# Patient Record
Sex: FEMALE | Race: WHITE | Employment: FULL TIME | URBAN - METROPOLITAN AREA
[De-identification: names, ages, dates, MRNs, and addresses within clinical notes are randomized per-mention and may not be internally consistent; named-entity substitution may affect disease eponyms.]

---

## 2022-09-23 ENCOUNTER — TELEPHONE (OUTPATIENT)
Dept: BARIATRICS/WEIGHT MGMT | Age: 42
End: 2022-09-23

## 2022-09-23 ENCOUNTER — INITIAL CONSULT (OUTPATIENT)
Dept: BARIATRICS/WEIGHT MGMT | Age: 42
End: 2022-09-23
Payer: COMMERCIAL

## 2022-09-23 VITALS
SYSTOLIC BLOOD PRESSURE: 173 MMHG | TEMPERATURE: 97 F | DIASTOLIC BLOOD PRESSURE: 101 MMHG | WEIGHT: 293 LBS | RESPIRATION RATE: 20 BRPM | BODY MASS INDEX: 51.91 KG/M2 | HEIGHT: 63 IN | HEART RATE: 81 BPM

## 2022-09-23 DIAGNOSIS — K30 INDIGESTION: Primary | ICD-10-CM

## 2022-09-23 DIAGNOSIS — K21.9 GASTROESOPHAGEAL REFLUX DISEASE WITHOUT ESOPHAGITIS: ICD-10-CM

## 2022-09-23 DIAGNOSIS — E66.01 MORBID OBESITY DUE TO EXCESS CALORIES (HCC): ICD-10-CM

## 2022-09-23 PROCEDURE — 99202 OFFICE O/P NEW SF 15 MIN: CPT

## 2022-09-23 PROCEDURE — G8419 CALC BMI OUT NRM PARAM NOF/U: HCPCS | Performed by: SURGERY

## 2022-09-23 PROCEDURE — 1036F TOBACCO NON-USER: CPT | Performed by: SURGERY

## 2022-09-23 PROCEDURE — 99204 OFFICE O/P NEW MOD 45 MIN: CPT | Performed by: SURGERY

## 2022-09-23 PROCEDURE — G8427 DOCREV CUR MEDS BY ELIG CLIN: HCPCS | Performed by: SURGERY

## 2022-09-23 NOTE — PROGRESS NOTES
Becky Holguin  9/23/2022  [facility]    Initial Evaluation  Bariatric Surgery and EGD       Subjective:  CHIEF COMPLAINT: Morbid obesity, malnutrition, Hypertension, Obstructive Sleep Apnea, Dyspnea on Exertion, Depression, Extremity Edema, Non-alcoholic Steatohepatitis (SAHU), Polycystic Ovarian Syndrome, and Venous Stasis Disease    HISTORY OF PRESENT ILLNESS: Becky Holguin is a morbidly-obese 36 y.o.  female, who weighs (!) 376 lb (170.6 kg). She is 229 pounds over her ideal body weight. The severity is severe with Body mass index is 66.61 kg/m². She has multiple medical problems aggravated by her obesity. They have been overweight since age 14. She wishes to have bariatric surgery so that she can lose a large amount of weight to a goal of their ideal body weight based on height, build and sex, and keep the weight off. I have met with [facility] in the [facility] where we discussed the surgery in great detail and went over the risks and benefits. She has watched our informational video so she understands all of the extensive risks involved. She states that she understands all of the risks and wishes to proceed with the evaluation. We have discussed the different surgical options in detail with use of diagrams and drawings to detail the procedures, risks and alternatives. We discussed the postoperative diet and proposed life long diet for weight management after surgery.      They are a nonsmoker  They have no significant exposure to second hand smoke    Past Medical History  Patient Active Problem List   Diagnosis    High-risk pregnancy    Passive smoker    Morbid obesity with BMI of 50.0-59.9, adult ([address]    AMA (advanced maternal age) multigravida 35+    Maternal morbid obesity, antepartum ([address]    Elderly multigravida with antepartum condition or complication    Suspected damage to fetus from drugs, affecting management of mother, antepartum    M[facility] morbid obesity, antepartum [address]    Benign essential hypertension, antepartum    HTN complicating peripregnancy, antepartum    Proteinuria complicating pregnancy in third trimester     Past Surgical History  Past Surgical History:   Procedure Laterality Date     SECTION      COLPOSCOPY      DILATION AND CURETTAGE      SKIN BIOPSY        Allergies: Tetracyclines & related and Oxycodone     Home Medications  Current Outpatient Medications   Medication Sig Dispense Refill    DULoxetine (CYMBALTA) 60 MG extended release capsule Take 60 mg by mouth daily      losartan (COZAAR) 25 MG tablet Take 25 mg by mouth daily      LORazepam (ATIVAN) 0.5 MG tablet Take 0.5 mg by mouth every 6 hours as needed for Anxiety. No current facility-administered medications for this visit. Social History:   TOBACCO:   reports that she has never smoked. She has never used smokeless tobacco.  All smokers must join the free smoking cessation program and stop smoking for 3 months before having any Bariatric surgery. ETOH:    reports no history of alcohol use. The patient has a family history that is negative for severe cardiovascular or respiratory issues, negative for reaction to anesthesia. Review of Systems    A complete 10 system review was performed and are otherwise negative unless mentioned in the above HPI. Specific negatives are listed below but may not include all those reviewed.     General ROS: negative obtundation, AMS  ENT ROS: negative rhinorrhea, epistaxis  Allergy and Immunology ROS: negative itchy/watery eyes or nasal congestion  Hematological and Lymphatic ROS: negative spontaneous bleeding or bruising  Endocrine ROS: negative  lethargy, mood swings, palpitations or polydipsia/polyuria  Respiratory ROS: negative sputum changes, stridor, tachypnea or wheezing  Cardiovascular ROS: negative for - loss of consciousness, murmur or orthopnea  Gastrointestinal ROS: negative for - hematochezia or hematemesis  Genito-Urinary ROS: negative for -  genital discharge or hematuria  Musculoskeletal ROS: negative for - focal weakness, gangrene  Psych/Neuro ROS: negative for - visual or auditory hallucinations, suicidal ideation      Physical Exam:   VITALS: BP (!) 173/101 (Site: Left Lower Arm, Position: Sitting, Cuff Size: Large Adult)   Pulse 81   Temp 97 °F (36.1 °C) (Temporal)   Resp 20   Ht 5' 3\" (1.6 m)   Wt (!) 376 lb (170.6 kg)   BMI 66.61 kg/m²   General appearance: AAO, NAD  Eyes: PERRL, EOMI, red conjunctiva  Lungs: equal chest rise bilateral, no wheeze, no rhonchi  Chest wall: atraumatic, no tenderness, no echymosis or abrasions  Heart: reg rate, no murmur  Abdomen: soft, nondistended, nontender, obese  Extremities: full ROM all 4 ext, no gross motor or sensory deficits  Pulses: 2+ distal  Skin: warm and dry  Neurologic: spontanous eye opening, purposeful, follows complex commands    Assessment/Plan:  Mehdi Robertson is a morbidly-obese 36 y.o. female with failure of medical management, inability to keep the weight off with diet and exercise. She is interested in Surgical weight loss and specifically we discussed at length the options of Laparoscopic Sd-en- Y Gastric Bypass or Sleeve Gastrectomy. We discussed that our goal is to ameliorate the medical problems and not to obtain a specific body mass index. She understands the risks and benefits, and wishes to proceed with the evaluation. 1. Pre-op evaluation  EGD with biopsy- Scheduled   - US GALLBLADDER RUQ; Scheduled     2. Morbid obesity due to excess calories ([address]    - medical weight loss management- dietary and nutritional counseling provided for 15min during our conversation with emphasis on protein intake, low calorie, limit late night eating and eating away from the table with other distractions. - Comprehensive Metabolic Panel; Future  - CBC; Future  - Hemoglobin A1C; Future  - Ferritin; Future  - Triglyceride;  Future  - Cholesterol, Total; Future  - Zinc; Future  - Vitamin B12; Future  - Folate; Future  - Vitamin B1; Future  - Vitamin D 25 Hydroxy; Future  - Prealbumin; Future  - US GALLBLADDER RUQ; Scheduled     3. Gastroesophageal reflux disease without esophagitis  RUQ US eval gallbladder disease  EGD eval H pylori/gastritis  Continue tums prn    4. PCOS- weight loss assistance      Psych evaluation, medical clearance, right upper quadrant ultrasound to evaluate for fatty liver disease and gallbladder abnormalities. These patients often have vitamin deficiencies so I will do screening labs for malnutrition. I will do an upper endoscopy to check for hiatal hernias, ulcers and H. Pylori while we wait for insurance approval for the surgery. I have spent over 45min in evaluation of the patient including greater than 50% of that time face to face discussing surgical options, medical and surgical history, plans for medical weight loss management and preoperative clearance for surgery. They did not have family present for the discussion and visual aides and drawings were used to explain anatomy and surgical procedures.      Follow up with surgical weight loss office after completed the above to begin dietary and nutritional counseling to achieve weight loss at or near 10% their excess body weight prior to surgery    Physician Signature: Electronically signed by Dr. Margaret Dugan MD    Send copy of H&P to PCP, Floridalma Rivera DO

## 2022-09-23 NOTE — TELEPHONE ENCOUNTER
Prior Authorization Form  DEMOGRAPHICS:    Patient Name:  Shari Reynoso  Patient :  1980            Insurance:  Payor: Wes Vaughan / Plan: Bryan Aaron / Product Type: *No Product type* /   Insurance ID Number:    Payer/Plan Subscr  Sex Relation Sub.  Ins. ID Effective Group Num   [facility] AARON ULLOA 1980 Female Self [id number] 5/[birth year]/15 [facility] [address]         DIAGNOSIS & PROCEDURE:    Procedure/Operation: egd           CPT Code: 06361    Diagnosis:  gerd    ICD10 Code: k21.9    Location:  [facility]    Surgeon:  Pj Dunne INFORMATION:    Date: 10/20/22    Time:               Anesthesia:  MAC/TIVA                                                       Status:  Outpatient        Special Comments:         Electronically signed by Alejandra Vaughan MA on 2022 at 12:05 PM

## 2022-09-23 NOTE — PATIENT INSTRUCTIONS
What is the next step to proceed with weight loss surgery? Please be aware that any co-pays or deductibles may be requested prior to testing and / or procedures. You will need to schedule a psychological evaluation for weight loss surgery. Patients will be required to complete all psychological testing as required by the mental health provider. Patients must also follow all of the provider's recommendations before weight loss surgery can be scheduled. The evaluation must be done a standard way for weight loss surgery. We strongly recommend that you contact one of our preferred providers listed below to arrange this:      Jose Francisco Chapin, [facility], [place]  [address] [address] [facility]   [phone]    Juany [facility] and [address] [facility] [address] [facility]   [phone]    Dr. Mark Hoff, PhD    [facility]. [facility]    [phone]      You will also need to plan on attending a 2 hour nutrition class at the [facility] prior to your surgery. We will schedule this for you when we schedule your surgery. Please remember to have your labs drawn 10 days prior to your first scheduled dietary appointment. Please remember, that while we will submit your case to insurance for surgery authorization, it is your responsibility to know if your plan covers weight loss surgery and keep up-to-date with changes to your insurance coverage. We will do everything possible to help you get approved for weight loss surgery, but cannot guarantee an approval.     Please note that you will not be submitted to your insurance company until all pre-operative testing requirements are met.

## 2022-10-14 ENCOUNTER — HOSPITAL ENCOUNTER (OUTPATIENT)
Dept: ULTRASOUND IMAGING | Age: 42
Discharge: HOME | End: 2022-10-16
Payer: COMMERCIAL

## 2022-10-14 ENCOUNTER — HOSPITAL ENCOUNTER (OUTPATIENT)
Age: 42
Discharge: HOME | End: 2022-10-14
Payer: COMMERCIAL

## 2022-10-14 DIAGNOSIS — E66.01 MORBID OBESITY DUE TO EXCESS CALORIES (HCC): ICD-10-CM

## 2022-10-14 DIAGNOSIS — K30 INDIGESTION: ICD-10-CM

## 2022-10-14 LAB
ALBUMIN SERPL-MCNC: 4.3 G/DL (ref 3.5–5.2)
ALP BLD-CCNC: 87 U/L (ref 35–104)
ALT SERPL-CCNC: 13 U/L (ref 0–32)
ANION GAP SERPL CALCULATED.3IONS-SCNC: 11 MMOL/L (ref 7–16)
AST SERPL-CCNC: 12 U/L (ref 0–31)
BASOPHILS ABSOLUTE: 0.05 E9/L (ref 0–0.2)
BASOPHILS RELATIVE PERCENT: 0.6 % (ref 0–2)
BILIRUB SERPL-MCNC: 0.4 MG/DL (ref 0–1.2)
BUN BLDV-MCNC: 13 MG/DL (ref 6–20)
CALCIUM SERPL-MCNC: 9.2 MG/DL (ref 8.6–10.2)
CHLORIDE BLD-SCNC: 105 MMOL/L (ref 98–107)
CHOLESTEROL, TOTAL: 179 MG/DL (ref 0–199)
CO2: 24 MMOL/L (ref 22–29)
CREAT SERPL-MCNC: 0.7 MG/DL (ref 0.5–1)
EOSINOPHILS ABSOLUTE: 0.11 E9/L (ref 0.05–0.5)
EOSINOPHILS RELATIVE PERCENT: 1.3 % (ref 0–6)
FERRITIN: 46 NG/ML
FOLATE: 6.5 NG/ML (ref 4.8–24.2)
GFR AFRICAN AMERICAN: >60
GFR SERPL CREATININE-BSD FRML MDRD: 91 ML/MIN/1.73
GLUCOSE BLD-MCNC: 95 MG/DL (ref 74–99)
HBA1C MFR BLD: 5.3 % (ref 4–5.6)
HCT VFR BLD CALC: 44.2 % (ref 34–48)
HEMOGLOBIN: 13.9 G/DL (ref 11.5–15.5)
IMMATURE GRANULOCYTES #: 0.04 E9/L
IMMATURE GRANULOCYTES %: 0.5 % (ref 0–5)
LYMPHOCYTES ABSOLUTE: 2.63 E9/L (ref 1.5–4)
LYMPHOCYTES RELATIVE PERCENT: 31.2 % (ref 20–42)
MCH RBC QN AUTO: 30.8 PG (ref 26–35)
MCHC RBC AUTO-ENTMCNC: 31.4 % (ref 32–34.5)
MCV RBC AUTO: 98 FL (ref 80–99.9)
MONOCYTES ABSOLUTE: 0.67 E9/L (ref 0.1–0.95)
MONOCYTES RELATIVE PERCENT: 7.9 % (ref 2–12)
NEUTROPHILS ABSOLUTE: 4.94 E9/L (ref 1.8–7.3)
NEUTROPHILS RELATIVE PERCENT: 58.5 % (ref 43–80)
PDW BLD-RTO: 13.4 FL (ref 11.5–15)
PLATELET # BLD: 334 E9/L (ref 130–450)
PMV BLD AUTO: 10.4 FL (ref 7–12)
POTASSIUM SERPL-SCNC: 3.9 MMOL/L (ref 3.5–5)
PREALBUMIN: 23 MG/DL (ref 20–40)
RBC # BLD: 4.51 E12/L (ref 3.5–5.5)
SODIUM BLD-SCNC: 140 MMOL/L (ref 132–146)
TOTAL PROTEIN: 7.6 G/DL (ref 6.4–8.3)
TRIGL SERPL-MCNC: 101 MG/DL (ref 0–149)
VITAMIN B-12: 292 PG/ML (ref 211–946)
WBC # BLD: 8.4 E9/L (ref 4.5–11.5)

## 2022-10-14 PROCEDURE — 80053 COMPREHEN METABOLIC PANEL: CPT

## 2022-10-14 PROCEDURE — 82465 ASSAY BLD/SERUM CHOLESTEROL: CPT

## 2022-10-14 PROCEDURE — 76705 ECHO EXAM OF ABDOMEN: CPT

## 2022-10-14 PROCEDURE — 36415 COLL VENOUS BLD VENIPUNCTURE: CPT

## 2022-10-14 PROCEDURE — 82746 ASSAY OF FOLIC ACID SERUM: CPT

## 2022-10-14 PROCEDURE — 82607 VITAMIN B-12: CPT

## 2022-10-14 PROCEDURE — 84478 ASSAY OF TRIGLYCERIDES: CPT

## 2022-10-14 PROCEDURE — 84134 ASSAY OF PREALBUMIN: CPT

## 2022-10-14 PROCEDURE — 83036 HEMOGLOBIN GLYCOSYLATED A1C: CPT

## 2022-10-14 PROCEDURE — 85025 COMPLETE CBC W/AUTO DIFF WBC: CPT

## 2022-10-14 PROCEDURE — 84630 ASSAY OF ZINC: CPT

## 2022-10-14 PROCEDURE — 82728 ASSAY OF FERRITIN: CPT

## 2022-10-14 PROCEDURE — 84425 ASSAY OF VITAMIN B-1: CPT

## 2022-10-18 LAB — ZINC: 92.5 UG/DL (ref 60–120)

## 2022-10-19 ENCOUNTER — ANESTHESIA EVENT (OUTPATIENT)
Dept: ENDOSCOPY | Age: 42
End: 2022-10-19
Payer: COMMERCIAL

## 2022-10-19 RX ORDER — ACETAMINOPHEN 500 MG
1000 TABLET ORAL PRN
COMMUNITY

## 2022-10-19 RX ORDER — DOCUSATE SODIUM 100 MG/1
100 CAPSULE, LIQUID FILLED ORAL 2 TIMES DAILY PRN
COMMUNITY

## 2022-10-19 RX ORDER — IBUPROFEN 800 MG/1
800 TABLET ORAL PRN
COMMUNITY

## 2022-10-19 RX ORDER — OMEPRAZOLE 40 MG/1
40 CAPSULE, DELAYED RELEASE ORAL DAILY
COMMUNITY

## 2022-10-19 RX ORDER — FLUTICASONE PROPIONATE 50 MCG
1 SPRAY, SUSPENSION (ML) NASAL PRN
COMMUNITY

## 2022-10-19 NOTE — PROGRESS NOTES
[facility]                                                                                                                    PRE OP INSTRUCTIONS FOR  Raheem Paris        Date: 10/19/2022    Date of surgery: 10/20/22   Arrival Time: Hospital will call you between 5pm and 7pm with your final arrival time for surgery    Nothing by mouth (NPO) as instructed.____________________________________________________________________    Take the following medications with a small sip of water on the morning of Surgery: omeprazole, prn ativan     Diabetics may take evening dose of insulin but none after midnight. If you feel symptomatic or low blood sugar morning of surgery drink 1-2 ounces of apple juice only. Aspirin, Ibuprofen, Advil, Naproxen, Vitamin E and other Anti-inflammatory products should be stopped  before surgery  as directed by your physician. Take Tylenol only unless instructed otherwise by your surgeon. Check with your Doctor regarding stopping Plavix, Coumadin, Lovenox, Eliquis, Effient, or other blood thinners. Do not smoke,use illicit drugs and do not drink any alcoholic beverages 24 hours prior to surgery. You may brush your teeth the morning of surgery. DO NOT SWALLOW WATER    You MUST make arrangements for a responsible adult to take you home after your surgery. You will not be allowed to leave alone or drive yourself home. It is strongly suggested someone stay with you the first 24 hrs. Your surgery will be cancelled if you do not have a ride home. PEDIATRIC PATIENTS ONLY:  A parent/legal guardian must accompany a child scheduled for surgery and plan to stay at the hospital until the child is discharged. Please do not bring other children with you.     Please wear simple, loose fitting clothing to the hospital.  Daryel Bladimir not bring valuables (money, credit cards, checkbooks, etc.) Do not wear any makeup (including no eye makeup) or nail polish on your fingers or toes.    DO NOT wear any jewelry or piercings on day of surgery. All body piercing jewelry must be removed. Shower the night before surgery with __x_Antibacterial soap /ROSANNE WIPES________    TOTAL JOINT REPLACEMENT/HYSTERECTOMY PATIENTS ONLY---Remember to bring Blood Bank bracelet to the hospital on the day of surgery. If you have a Living Will and Durable Power of  for Healthcare, please bring in a copy. If appropriate bring crutches, inspirex, WALKER, CANE etc... Notify your Surgeon if you develop any illness between now and surgery time, cough, cold, fever, sore throat, nausea, vomiting, etc.  Please notify your surgeon if you experience dizziness, shortness of breath or blurred vision between now & the time of your surgery. If you have ___dentures, they will be removed before going to the OR; we will provide you a container. If you wear ___contact lenses or ___glasses, they will be removed; please bring a case for them. To provide excellent care visitors will be limited to 2 in the room at any given time. Please bring picture ID and insurance card. During flu season no children under the age of 15 are permitted in the hospital for the safety of all patients. Other please check in at the information desk/main lobby. Please call AMBULATORY CARE if you have any further questions.    [facility]     [address]

## 2022-10-20 ENCOUNTER — ANESTHESIA (OUTPATIENT)
Dept: ENDOSCOPY | Age: 42
End: 2022-10-20
Payer: COMMERCIAL

## 2022-10-20 ENCOUNTER — HOSPITAL ENCOUNTER (OUTPATIENT)
Age: 42
Setting detail: OUTPATIENT SURGERY
Discharge: HOME | End: 2022-10-20
Attending: SURGERY | Admitting: SURGERY
Payer: COMMERCIAL

## 2022-10-20 VITALS
SYSTOLIC BLOOD PRESSURE: 142 MMHG | OXYGEN SATURATION: 98 % | WEIGHT: 293 LBS | BODY MASS INDEX: 51.91 KG/M2 | HEART RATE: 83 BPM | DIASTOLIC BLOOD PRESSURE: 70 MMHG | TEMPERATURE: 98.9 F | RESPIRATION RATE: 16 BRPM | HEIGHT: 63 IN

## 2022-10-20 DIAGNOSIS — K21.9 GASTROESOPHAGEAL REFLUX DISEASE, UNSPECIFIED WHETHER ESOPHAGITIS PRESENT: ICD-10-CM

## 2022-10-20 LAB
HCG, URINE, POC: NEGATIVE
Lab: NORMAL
NEGATIVE QC PASS/FAIL: NORMAL
POSITIVE QC PASS/FAIL: NORMAL

## 2022-10-20 PROCEDURE — 2580000003 HC RX 258: Performed by: SURGERY

## 2022-10-20 PROCEDURE — 2580000003 HC RX 258: Performed by: NURSE ANESTHETIST, CERTIFIED REGISTERED

## 2022-10-20 PROCEDURE — 6360000002 HC RX W HCPCS: Performed by: NURSE ANESTHETIST, CERTIFIED REGISTERED

## 2022-10-20 PROCEDURE — 3700000000 HC ANESTHESIA ATTENDED CARE: Performed by: SURGERY

## 2022-10-20 PROCEDURE — 7100000011 HC PHASE II RECOVERY - ADDTL 15 MIN: Performed by: SURGERY

## 2022-10-20 PROCEDURE — 43239 EGD BIOPSY SINGLE/MULTIPLE: CPT | Performed by: SURGERY

## 2022-10-20 PROCEDURE — 3609012400 HC EGD TRANSORAL BIOPSY SINGLE/MULTIPLE: Performed by: SURGERY

## 2022-10-20 PROCEDURE — 2709999900 HC NON-CHARGEABLE SUPPLY: Performed by: SURGERY

## 2022-10-20 PROCEDURE — 7100000010 HC PHASE II RECOVERY - FIRST 15 MIN: Performed by: SURGERY

## 2022-10-20 PROCEDURE — 88305 TISSUE EXAM BY PATHOLOGIST: CPT

## 2022-10-20 PROCEDURE — 88342 IMHCHEM/IMCYTCHM 1ST ANTB: CPT

## 2022-10-20 RX ORDER — LIDOCAINE HYDROCHLORIDE 20 MG/ML
INJECTION, SOLUTION INTRAVENOUS PRN
Status: DISCONTINUED | OUTPATIENT
Start: 2022-10-20 | End: 2022-10-20 | Stop reason: SDUPTHER

## 2022-10-20 RX ORDER — SODIUM CHLORIDE 0.9 % (FLUSH) 0.9 %
5-40 SYRINGE (ML) INJECTION PRN
Status: DISCONTINUED | OUTPATIENT
Start: 2022-10-20 | End: 2022-10-20 | Stop reason: HOSPADM

## 2022-10-20 RX ORDER — SODIUM CHLORIDE 9 MG/ML
INJECTION, SOLUTION INTRAVENOUS CONTINUOUS PRN
Status: DISCONTINUED | OUTPATIENT
Start: 2022-10-20 | End: 2022-10-20 | Stop reason: SDUPTHER

## 2022-10-20 RX ORDER — SODIUM CHLORIDE, SODIUM LACTATE, POTASSIUM CHLORIDE, CALCIUM CHLORIDE 600; 310; 30; 20 MG/100ML; MG/100ML; MG/100ML; MG/100ML
INJECTION, SOLUTION INTRAVENOUS CONTINUOUS
Status: DISCONTINUED | OUTPATIENT
Start: 2022-10-20 | End: 2022-10-20 | Stop reason: HOSPADM

## 2022-10-20 RX ORDER — PROPOFOL 10 MG/ML
INJECTION, EMULSION INTRAVENOUS CONTINUOUS PRN
Status: DISCONTINUED | OUTPATIENT
Start: 2022-10-20 | End: 2022-10-20 | Stop reason: SDUPTHER

## 2022-10-20 RX ORDER — SODIUM CHLORIDE 0.9 % (FLUSH) 0.9 %
5-40 SYRINGE (ML) INJECTION EVERY 12 HOURS SCHEDULED
Status: DISCONTINUED | OUTPATIENT
Start: 2022-10-20 | End: 2022-10-20 | Stop reason: HOSPADM

## 2022-10-20 RX ORDER — SODIUM CHLORIDE 9 MG/ML
INJECTION, SOLUTION INTRAVENOUS CONTINUOUS
Status: DISCONTINUED | OUTPATIENT
Start: 2022-10-20 | End: 2022-10-20 | Stop reason: HOSPADM

## 2022-10-20 RX ORDER — SODIUM CHLORIDE 9 MG/ML
INJECTION, SOLUTION INTRAVENOUS PRN
Status: DISCONTINUED | OUTPATIENT
Start: 2022-10-20 | End: 2022-10-20 | Stop reason: HOSPADM

## 2022-10-20 RX ADMIN — LIDOCAINE HYDROCHLORIDE 30 MG: 20 INJECTION, SOLUTION INTRAVENOUS at 07:44

## 2022-10-20 RX ADMIN — PROPOFOL 180 MCG/KG/MIN: 10 INJECTION, EMULSION INTRAVENOUS at 07:44

## 2022-10-20 RX ADMIN — SODIUM CHLORIDE: 900 INJECTION, SOLUTION INTRAVENOUS at 07:39

## 2022-10-20 RX ADMIN — SODIUM CHLORIDE: 9 INJECTION, SOLUTION INTRAVENOUS at 07:15

## 2022-10-20 ASSESSMENT — PAIN - FUNCTIONAL ASSESSMENT: PAIN_FUNCTIONAL_ASSESSMENT: NONE - DENIES PAIN

## 2022-10-20 NOTE — H&P
Kamlesh Garland  10/20/2022  [facility]    Initial Evaluation  Bariatric Surgery and EGD       Subjective:  CHIEF COMPLAINT: Morbid obesity, malnutrition, Hypertension, Obstructive Sleep Apnea, Dyspnea on Exertion, Depression, Extremity Edema, Non-alcoholic Steatohepatitis (SAHU), Polycystic Ovarian Syndrome, and Venous Stasis Disease    HISTORY OF PRESENT ILLNESS: Kamlesh Garland is a morbidly-obese 36 y.o.  female, who weighs (!) 376 lb (170.6 kg). She is 229 pounds over her ideal body weight. The severity is severe with Body mass index is 66.55 kg/m². She has multiple medical problems aggravated by her obesity. They have been overweight since age 14. She wishes to have bariatric surgery so that she can lose a large amount of weight to a goal of their ideal body weight based on height, build and sex, and keep the weight off. I have met with her in the [facility] where we discussed the surgery in great detail and went over the risks and benefits. She has watched our informational video so she understands all of the extensive risks involved. She states that she understands all of the risks and wishes to proceed with the evaluation. We have discussed the different surgical options in detail with use of diagrams and drawings to detail the procedures, risks and alternatives. We discussed the postoperative diet and proposed life long diet for weight management after surgery.      They are a nonsmoker  They have no significant exposure to second hand smoke    Past Medical History  Patient Active Problem List   Diagnosis    High-risk pregnancy    Passive smoker    Morbid obesity with BMI of 50.0-59.9, adult ([address]    AMA (advanced maternal age) multigravida 35+    Maternal morbid obesity, antepartum ([address]    Elderly multigravida with antepartum condition or complication    Suspected damage to fetus from drugs, affecting management of mother, antepartum    M[facility] morbid obesity, antepartum [address]    Benign essential hypertension, antepartum    HTN complicating peripregnancy, antepartum    Proteinuria complicating pregnancy in third trimester     Past Surgical History  Past Surgical History:   Procedure Laterality Date     SECTION      COLPOSCOPY      DILATION AND CURETTAGE      SKIN BIOPSY        Allergies: Tetracyclines & related and Oxycodone     Home Medications  Current Facility-Administered Medications   Medication Dose Route Frequency Provider Last Rate Last Admin    lactated ringers infusion   IntraVENous Continuous Myra Haas MD        0.9 % sodium chloride infusion   IntraVENous Continuous August Carter MD        sodium chloride flush 0.9 % injection 5-40 mL  5-40 mL IntraVENous 2 times per day August Carter MD        sodium chloride flush 0.9 % injection 5-40 mL  5-40 mL IntraVENous PRN August Carter MD        0.9 % sodium chloride infusion   IntraVENous PRN August Carter MD           Social History:   TOBACCO:   reports that she has never smoked. She has never used smokeless tobacco.  All smokers must join the free smoking cessation program and stop smoking for 3 months before having any Bariatric surgery. ETOH:    reports no history of alcohol use. The patient has a family history that is negative for severe cardiovascular or respiratory issues, negative for reaction to anesthesia. Review of Systems    A complete 10 system review was performed and are otherwise negative unless mentioned in the above HPI. Specific negatives are listed below but may not include all those reviewed.     General ROS: negative obtundation, AMS  ENT ROS: negative rhinorrhea, epistaxis  Allergy and Immunology ROS: negative itchy/watery eyes or nasal congestion  Hematological and Lymphatic ROS: negative spontaneous bleeding or bruising  Endocrine ROS: negative  lethargy, mood swings, palpitations or polydipsia/polyuria  Respiratory ROS: negative sputum changes, stridor, tachypnea or wheezing  Cardiovascular ROS: negative for - loss of consciousness, murmur or orthopnea  Gastrointestinal ROS: negative for - hematochezia or hematemesis  Genito-Urinary ROS: negative for -  genital discharge or hematuria  Musculoskeletal ROS: negative for - focal weakness, gangrene  Psych/Neuro ROS: negative for - visual or auditory hallucinations, suicidal ideation      Physical Exam:   VITALS: BP (!) 146/87   Pulse 86   Temp 97.7 °F (36.5 °C) (Infrared)   Resp 18   Ht 5' 3\" (1.6 m)   Wt (!) 375 lb 11.2 oz (170.4 kg)   LMP 10/01/2022   SpO2 98%   BMI 66.55 kg/m²   General appearance: AAO, NAD  Eyes: PERRL, EOMI, red conjunctiva  Lungs: equal chest rise bilateral, no wheeze, no rhonchi  Chest wall: atraumatic, no tenderness, no echymosis or abrasions  Heart: reg rate, no murmur  Abdomen: soft, nondistended, nontender, obese  Extremities: full ROM all 4 ext, no gross motor or sensory deficits  Pulses: 2+ distal  Skin: warm and dry  Neurologic: spontanous eye opening, purposeful, follows complex commands    Assessment/Plan:  Bruce Mckinney is a morbidly-obese 36 y.o. female with failure of medical management, inability to keep the weight off with diet and exercise. She is interested in Surgical weight loss and specifically we discussed at length the options of Laparoscopic Sd-en- Y Gastric Bypass or Sleeve Gastrectomy. We discussed that our goal is to ameliorate the medical problems and not to obtain a specific body mass index. She understands the risks and benefits, and wishes to proceed with the evaluation. 1. Pre-op evaluation  EGD with biopsy- Scheduled   - US GALLBLADDER RUQ; Scheduled     2.  Morbid obesity due to excess calories ([address]    - medical weight loss management- dietary and nutritional counseling provided for 15min during our conversation with emphasis on protein intake, low calorie, limit late night eating and eating away from the table with other distractions. - Comprehensive Metabolic Panel; Future  - CBC; Future  - Hemoglobin A1C; Future  - Ferritin; Future  - Triglyceride; Future  - Cholesterol, Total; Future  - Zinc; Future  - Vitamin B12; Future  - Folate; Future  - Vitamin B1; Future  - Vitamin D 25 Hydroxy; Future  - Prealbumin; Future  - US GALLBLADDER RUQ; Scheduled     3. Gastroesophageal reflux disease without esophagitis  RUQ US eval gallbladder disease  EGD eval H pylori/gastritis  Continue tums prn    4. PCOS- weight loss assistance      Psych evaluation, medical clearance, right upper quadrant ultrasound to evaluate for fatty liver disease and gallbladder abnormalities. These patients often have vitamin deficiencies so I will do screening labs for malnutrition. I will do an upper endoscopy to check for hiatal hernias, ulcers and H. Pylori while we wait for insurance approval for the surgery. I have spent over 45min in evaluation of the patient including greater than 50% of that time face to face discussing surgical options, medical and surgical history, plans for medical weight loss management and preoperative clearance for surgery. They did not have family present for the discussion and visual aides and drawings were used to explain anatomy and surgical procedures.      Follow up with surgical weight loss office after completed the above to begin dietary and nutritional counseling to achieve weight loss at or near 10% their excess body weight prior to surgery    Physician Signature: Electronically signed by Gi Pacheco MD      Send copy of H&P to PCP, Merle Gomez MD

## 2022-10-20 NOTE — ANESTHESIA PRE PROCEDURE
Department of Anesthesiology  Preprocedure Note       Name:  Marvin Klein   Age:  36 y.o.  :  1980                                          MRN:  [id number]         Date:  10/20/2022      Surgeon: Chon Farley):  Bernabe Simpson MD    Procedure: Procedure(s):  EGD ESOPHAGOGASTRODUODENOSCOPY    Medications prior to admission:   Prior to Admission medications    Medication Sig Start Date End Date Taking? Authorizing Provider   omeprazole (PRILOSEC) 40 MG delayed release capsule Take 40 mg by mouth daily   Yes Historical Provider, MD   docusate sodium (COLACE) 100 MG capsule Take 100 mg by mouth 2 times daily as needed for Constipation   Yes Historical Provider, MD   ibuprofen (ADVIL;MOTRIN) 800 MG tablet Take 800 mg by mouth as needed for Pain   Yes Historical Provider, MD   acetaminophen (TYLENOL) 500 MG tablet Take 1,000 mg by mouth as needed for Pain   Yes Historical Provider, MD   fluticasone (FLONASE) 50 MCG/ACT nasal spray 1 spray by Each Nostril route as needed for Rhinitis   Yes Historical Provider, MD   DULoxetine (CYMBALTA) 60 MG extended release capsule Take 60 mg by mouth nightly    Historical Provider, MD   losartan (COZAAR) 25 MG tablet Take 25 mg by mouth daily    Historical Provider, MD   LORazepam (ATIVAN) 0.5 MG tablet Take 0.5 mg by mouth every 6 hours as needed for Anxiety.     Historical Provider, MD       Current medications:    Current Facility-Administered Medications   Medication Dose Route Frequency Provider Last Rate Last Admin    lactated ringers infusion   IntraVENous Continuous Chelo Potts MD        0.9 % sodium chloride infusion   IntraVENous Continuous Ardyth Rough, MD        sodium chloride flush 0.9 % injection 5-40 mL  5-40 mL IntraVENous 2 times per day Bernabe Simpson MD        sodium chloride flush 0.9 % injection 5-40 mL  5-40 mL IntraVENous PRN Bernabe Simpson MD        0.9 % sodium chloride infusion   IntraVENous PRN Bernabe Simpson MD Allergies: Allergies   Allergen Reactions    Tetracyclines & Related      Teeth turn yellow    Oxycodone Nausea And Vomiting       Problem List:    Patient Active Problem List   Diagnosis Code    High-risk pregnancy O09.90    Passive smoker Z77.22    Morbid obesity with BMI of 50.0-59.9, adult ([facility]) E66.01, Z68.43    AMA (advanced maternal age) multigravida 33+ O12.46    Maternal morbid obesity, antepartum ([address] O99.210, E77.80    Elderly multigravida with antepartum condition or complication K55.466    Suspected damage to fetus from drugs, affecting management of mother, antepartum O32. 5XX0    Maternal morbid obesity, antepartum ([address] O99.210, E66.01    Benign essential hypertension, antepartum Y05.263    HTN complicating peripregnancy, antepartum O16.9    Proteinuria complicating pregnancy in third trimester O12.13       Past Medical History:        Diagnosis Date    Anxiety     Benign essential hypertension antepartum 2015    Cancer ([facility] [address]     skin- labia majora, back    Depression     Morbid obesity due to excess calories ([address]     Obesity        Past Surgical History:        Procedure Laterality Date     SECTION      COLPOSCOPY      DILATION AND CURETTAGE      SKIN BIOPSY         Social History:    Social History     Tobacco Use    Smoking status: Never    Smokeless tobacco: Never   Substance Use Topics    Alcohol use:  No                                Counseling given: Not Answered      Vital Signs (Current):   Vitals:    10/20/22 0649   BP: (!) 146/87   Pulse: 86   Resp: 18   Temp: 97.7 °F (36.5 °C)   TempSrc: Infrared   SpO2: 98%   Weight: (!) 375 lb 11.2 oz (170.4 kg)   Height: 5' 3\" (1.6 m)                                              BP Readings from Last 3 Encounters:   10/20/22 (!) 146/87   22 (!) 173/101   21 126/72       NPO Status: Time of last liquid consumption: 0415 (sips with meds)                        Time of last solid consumption: 1900                        Date of last liquid consumption: 10/20/22                        Date of last solid food consumption: 10/19/22    BMI:   Wt Readings from Last 3 Encounters:   10/20/22 (!) 375 lb 11.2 oz (170.4 kg)   09/23/22 (!) 376 lb (170.6 kg)   07/22/21 (!) 388 lb 3.2 oz (176.1 kg)     Body mass index is 66.55 kg/m². CBC:   Lab Results   Component Value Date/Time    WBC 8.4 10/14/2022 08:30 AM    RBC 4.51 10/14/2022 08:30 AM    HGB 13.9 10/14/2022 08:30 AM    HCT 44.2 10/14/2022 08:30 AM    MCV 98.0 10/14/2022 08:30 AM    RDW 13.4 10/14/2022 08:30 AM     10/14/2022 08:30 AM       CMP:   Lab Results   Component Value Date/Time     10/14/2022 08:30 AM    K 3.9 10/14/2022 08:30 AM     10/14/2022 08:30 AM    CO2 24 10/14/2022 08:30 AM    BUN 13 10/14/2022 08:30 AM    CREATININE 0.7 10/14/2022 08:30 AM    GFRAA >60 10/14/2022 08:30 AM    LABGLOM 91 10/14/2022 08:30 AM    GLUCOSE 95 10/14/2022 08:30 AM    PROT 7.6 10/14/2022 08:30 AM    CALCIUM 9.2 10/14/2022 08:30 AM    BILITOT 0.4 10/14/2022 08:30 AM    ALKPHOS 87 10/14/2022 08:30 AM    AST 12 10/14/2022 08:30 AM    ALT 13 10/14/2022 08:30 AM       POC Tests: No results for input(s): POCGLU, POCNA, POCK, POCCL, POCBUN, POCHEMO, POCHCT in the last 72 hours.     Coags:   Lab Results   Component Value Date/Time    PROTIME 9.9 04/16/2015 05:00 PM    INR 0.9 04/16/2015 05:00 PM    APTT 24.6 04/16/2015 05:00 PM       HCG (If Applicable):   Lab Results   Component Value Date    PREGTESTUR negative 06/28/2016        ABGs: No results found for: PHART, PO2ART, BRN3TEC, TAI8XOC, BEART, W7EQSGEZ     Type & Screen (If Applicable):  No results found for: LABABO, LABRH    Drug/Infectious Status (If Applicable):  No results found for: HIV, HEPCAB    COVID-19 Screening (If Applicable): No results found for: COVID19        Anesthesia Evaluation  Patient summary reviewed no history of anesthetic complications:   Airway: Mallampati: II  TM distance: >3 FB   Neck ROM: full  Mouth opening: > = 3 FB   Dental: normal exam         Pulmonary:Negative Pulmonary ROS breath sounds clear to auscultation                             Cardiovascular:    (+) hypertension:,         Rhythm: regular                      Neuro/Psych:   (+) psychiatric history:            GI/Hepatic/Renal:   (+) GERD:, morbid obesity          Endo/Other: Negative Endo/Other ROS                    Abdominal:             Vascular: negative vascular ROS. Other Findings:           Anesthesia Plan      MAC     ASA 3       Induction: intravenous. MIPS: Prophylactic antiemetics administered. Anesthetic plan and risks discussed with patient. Plan discussed with CRNA.               [address] Oscar Neumann DO   10/20/2022

## 2022-10-20 NOTE — OP NOTE
[address] [place] [address] [facility]           Operative Report    DATE OF PROCEDURE: 10/20/2022    Jesse Ordonez    SURGEON: Sue Kim MD.     ASSISTANT: None    PREOPERATIVE DIAGNOSES:  GERD, morbid obesity, bloating    POSTOPERATIVE DIAGNOSES:   (1) No Hiatal Hernia, Effacement Hill Grade II  (2) No Esophagitis  (3) Mild Gastritis with some retained gastric contents  (4) No Duodenitis    OPERATION: Ntboxmlq-mgkowr-lfsmksenbonh with antral biopsies    ANESTHESIA: LMAC    EBL: None    COMPLICATIONS: None. History and consent: This is a 36y.o. year old female who is having GERD with plans to pursue surgical weight loss. I have discussed with the patient the indication, alternatives, and the possible risks and/or complications of upper endoscopy and the conscious sedation anesthesia. The patient and/or family understands and agrees to proceed. Monitoring and Safety:    The patient was placed on a cardiac monitor and vital signs, pulse oximetry and level of consciousness were continuously evaluated throughout the procedure. The patient was closely monitored until recovery from the medications was complete and the patient had returned to baseline status. Anesthesia was present at all times during the procedure. OPERATIONS: The patient was placed on the table and sedated while blood pressure, pulse and pulse oximetry were continuously monitored by the anesthesia team. A bite block was placed prior to sedation and the patient was placed in the left lateral decubitus position. A lubricated scope was easily passed into the upper esophagus which looked normal. The distal esophagus looked normal. The scope was passed into the stomach and retroflexed. Hill Grade II effacement. The gastroesophageal junction was at 40cm. There was no hiatal hernia. The scope was passed down toward the pylorus.  The antral mucosa showed mild gastritis without ulcerations but findings of consistent with gastroparesis and retained gastric contents. Biopsy was taken to check for H. pylori. The scope was then passed through the pylorus into the duodenal bulb which looked normal, then around to the distal duodenum which looked normal, and the scope was then withdrawn. The patient tolerated the procedure well.        Diet: Low fat, low calorie, high protein    PLAN:  (1) Follow up in office to discuss pathology, imaging, labs      Further Procedures:  Surgical weight loss pending approval    Physician Signature: Electronically signed by Dr. Augustus Essex

## 2022-10-20 NOTE — DISCHARGE INSTRUCTIONS
Upper GI Endoscopy: What to Expect at [address] [place] [address]  After you have an endoscopy, you will stay at the hospital or clinic for 1 to 2 hours. This will allow the medicine to wear off. You will be able to go home after your doctor or nurse checks to make sure you are not having any problems. You may have to stay overnight if you had treatment during the test. You may have a sore throat for a day or two after the test.  This care sheet gives you a general idea about what to expect after the test.  How can you care for yourself at home? Activity  Rest as much as you need to after you go home. You should be able to go back to your usual activities the day after the test.  Diet  Follow your doctor's directions for eating after the test.  Drink plenty of fluids (unless your doctor has told you not to). Medications  If you have a sore throat the day after the test, use an over-the-counter spray to numb your throat. I   Learning About How to Prepare for Weight-Loss Surgery  How can you prepare for weight-loss surgery? Having weight-loss surgery (also called bariatric surgery) is a big step. You can prepare for surgery by having a plan. Your plan may include your goals for losing weight and how to makes changes in your diet, activity, and lifestyle to help raise your chances of success. One way to prepare for surgery is to think about your goal or reason why you want to reach a healthy weight. Do you want to lower your blood pressure, cholesterol, or blood sugar? Do you want to be able to sleep better, play with your kids, or walk around the block? Having a reason can help you stay with your plan and meet your goals. Your weight-loss surgery team can help you meet your goals and get ready for surgery. Deniz Diehl work with a team that's trained to help you lose weight and make healthy changes in your life.  This team may include:  A medical doctor or nurse to help manage your care and schedule tests before surgery. A surgeon who specializes in weight-loss surgery. A registered dietitian to help you plan meals and make changes in the way you eat. An exercise specialist to help you be more active and get stronger. A therapist or counselor to help you learn why you eat and teach you ways to deal with stress and your emotions. Your team will also be there to help you prepare for life after surgery. They will help you adjust to new ways of eating and changes to your body. How will weight-loss surgery affect your life? You have likely thought a lot about how surgery may affect your life-how you will eat, how your body will look, or how you will feel. Some people feel overwhelmed with these changes. But planning can help you prepare for the changes and meet your weight-loss goals. One important step in your plan is to learn about the ways surgery will affect your life. These may include:  A slimmer you. You probably will lose weight very quickly in the first few months after surgery. As time goes on, your weight loss will slow down. How much weight you lose depends on what type of surgery you had and how well your new eating and activity plans are working for you. A new way of eating. Success in reaching and keeping a healthy weight depends on making lifelong changes in how you eat. After surgery, you raise your chances of success if you:  Eat just a few ounces of food at a time. Eat very slowly and chew your food to mush. Don't drink for 30 minutes before you eat, during your meal, and for 30 minutes after you eat. Are careful about drinking alcohol. Avoid foods that are high in fat or sugar. Take vitamin and mineral supplements. A healthier you. Weight-loss surgery can have some real health benefits. Problems like diabetes, high blood pressure, and sleep apnea may go away-or at least become easier to manage. A more active you.  After surgery, being active on most days of the week will help you reach your weight goal and avoid gaining back the weight you lose. A lot of extra skin. When you lose weight quickly, you may have a lot of extra skin. That's normal. You can have surgery to remove the extra skin if it bothers you. There are going to be some ups and downs while you get used to these changes. So another way to adjust is to identify who can help support you. Getting support from friends and family can help. And joining a support group for people who have had the surgery can be a big help too, because they know what you're going through. As you know, it's a big decision to have weight-loss surgery. But when you have a plan, you can focus on losing weight and living a healthier life. So what steps can you take to prepare for weight-loss surgery? Will you set some goals? Will you learn about how surgery can affect your life? How about asking family or friends for help? Write out your plan. Then get ready.

## 2022-10-20 NOTE — ANESTHESIA PRE PROCEDURE
Department of Anesthesiology  Preprocedure Note       Name:  Amy Connor   Age:  36 y.o.  :  1980                                          MRN:  [id number]         Date:  10/20/2022      Surgeon: Tasneem Ivey):  Esther Archer MD    Procedure: Procedure(s):  EGD ESOPHAGOGASTRODUODENOSCOPY    Medications prior to admission:   Prior to Admission medications    Medication Sig Start Date End Date Taking? Authorizing Provider   omeprazole (PRILOSEC) 40 MG delayed release capsule Take 40 mg by mouth daily   Yes Historical Provider, MD   docusate sodium (COLACE) 100 MG capsule Take 100 mg by mouth 2 times daily as needed for Constipation   Yes Historical Provider, MD   ibuprofen (ADVIL;MOTRIN) 800 MG tablet Take 800 mg by mouth as needed for Pain   Yes Historical Provider, MD   acetaminophen (TYLENOL) 500 MG tablet Take 1,000 mg by mouth as needed for Pain   Yes Historical Provider, MD   fluticasone (FLONASE) 50 MCG/ACT nasal spray 1 spray by Each Nostril route as needed for Rhinitis   Yes Historical Provider, MD   DULoxetine (CYMBALTA) 60 MG extended release capsule Take 60 mg by mouth nightly    Historical Provider, MD   losartan (COZAAR) 25 MG tablet Take 25 mg by mouth daily    Historical Provider, MD   LORazepam (ATIVAN) 0.5 MG tablet Take 0.5 mg by mouth every 6 hours as needed for Anxiety.     Historical Provider, MD       Current medications:    Current Facility-Administered Medications   Medication Dose Route Frequency Provider Last Rate Last Admin    lactated ringers infusion   IntraVENous Continuous Steven Morin MD        0.9 % sodium chloride infusion   IntraVENous Continuous Esther Archer  mL/hr at 10/20/22 0715 New Bag at 10/20/22 0715    sodium chloride flush 0.9 % injection 5-40 mL  5-40 mL IntraVENous 2 times per day Esther Archer MD        sodium chloride flush 0.9 % injection 5-40 mL  5-40 mL IntraVENous PRN Esther Archer MD        0.9 % sodium chloride infusion IntraVENous PRN Christian Coronel MD           Allergies: Allergies   Allergen Reactions    Tetracyclines & Related      Teeth turn yellow    Oxycodone Nausea And Vomiting       Problem List:    Patient Active Problem List   Diagnosis Code    High-risk pregnancy O09.90    Passive smoker Z77.22    Morbid obesity with BMI of 50.0-59.9, adult ([facility]) E66.01, Z68.43    AMA (advanced maternal age) multigravida 33+ O12.46    Maternal morbid obesity, antepartum ([address] O99.210, E77.80    Elderly multigravida with antepartum condition or complication T06.972    Suspected damage to fetus from drugs, affecting management of mother, antepartum O32. 5XX0    Maternal morbid obesity, antepartum ([address] O99.210, E66.01    Benign essential hypertension, antepartum K27.211    HTN complicating peripregnancy, antepartum O16.9    Proteinuria complicating pregnancy in third trimester O12.13       Past Medical History:        Diagnosis Date    Anxiety     Benign essential hypertension antepartum 2015    Cancer ([facility] [address] [address]     skin- labia majora, back    Depression     Morbid obesity due to excess calories ([address]     Obesity        Past Surgical History:        Procedure Laterality Date     SECTION      COLPOSCOPY      DILATION AND CURETTAGE      SKIN BIOPSY         Social History:    Social History     Tobacco Use    Smoking status: Never    Smokeless tobacco: Never   Substance Use Topics    Alcohol use:  No                                Counseling given: Not Answered      Vital Signs (Current):   Vitals:    10/20/22 0649   BP: (!) 146/87   Pulse: 86   Resp: 18   Temp: 36.5 °C (97.7 °F)   TempSrc: Infrared   SpO2: 98%   Weight: (!) 375 lb 11.2 oz (170.4 kg)   Height: 5' 3\" (1.6 m)                                              BP Readings from Last 3 Encounters:   10/20/22 (!) 146/87   22 (!) 173/101   21 126/72       NPO Status: Time of last liquid consumption: 0415 (sips with meds) Time of last solid consumption: 1900                        Date of last liquid consumption: 10/20/22                        Date of last solid food consumption: 10/19/22    BMI:   Wt Readings from Last 3 Encounters:   10/20/22 (!) 375 lb 11.2 oz (170.4 kg)   09/23/22 (!) 376 lb (170.6 kg)   07/22/21 (!) 388 lb 3.2 oz (176.1 kg)     Body mass index is 66.55 kg/m². CBC:   Lab Results   Component Value Date/Time    WBC 8.4 10/14/2022 08:30 AM    RBC 4.51 10/14/2022 08:30 AM    HGB 13.9 10/14/2022 08:30 AM    HCT 44.2 10/14/2022 08:30 AM    MCV 98.0 10/14/2022 08:30 AM    RDW 13.4 10/14/2022 08:30 AM     10/14/2022 08:30 AM       CMP:   Lab Results   Component Value Date/Time     10/14/2022 08:30 AM    K 3.9 10/14/2022 08:30 AM     10/14/2022 08:30 AM    CO2 24 10/14/2022 08:30 AM    BUN 13 10/14/2022 08:30 AM    CREATININE 0.7 10/14/2022 08:30 AM    GFRAA >60 10/14/2022 08:30 AM    LABGLOM 91 10/14/2022 08:30 AM    GLUCOSE 95 10/14/2022 08:30 AM    PROT 7.6 10/14/2022 08:30 AM    CALCIUM 9.2 10/14/2022 08:30 AM    BILITOT 0.4 10/14/2022 08:30 AM    ALKPHOS 87 10/14/2022 08:30 AM    AST 12 10/14/2022 08:30 AM    ALT 13 10/14/2022 08:30 AM       POC Tests: No results for input(s): POCGLU, POCNA, POCK, POCCL, POCBUN, POCHEMO, POCHCT in the last 72 hours.     Coags:   Lab Results   Component Value Date/Time    PROTIME 9.9 04/16/2015 05:00 PM    INR 0.9 04/16/2015 05:00 PM    APTT 24.6 04/16/2015 05:00 PM       HCG (If Applicable):   Lab Results   Component Value Date    PREGTESTUR negative 06/28/2016        ABGs: No results found for: PHART, PO2ART, XKS6GAS, UHR5ZEP, BEART, J5ANXDOX     Type & Screen (If Applicable):  No results found for: LABABO, LABRH    Drug/Infectious Status (If Applicable):  No results found for: HIV, HEPCAB    COVID-19 Screening (If Applicable): No results found for: [facility]        Anesthesia Evaluation     Anesthesia [address] APRN - CRNA 10/20/2022

## 2022-10-22 LAB — VITAMIN B1 WHOLE BLOOD: 163 NMOL/L (ref 70–180)

## 2022-10-28 ENCOUNTER — OFFICE VISIT (OUTPATIENT)
Dept: BARIATRICS/WEIGHT MGMT | Age: 42
End: 2022-10-28
Payer: COMMERCIAL

## 2022-10-28 ENCOUNTER — INITIAL CONSULT (OUTPATIENT)
Dept: BARIATRICS/WEIGHT MGMT | Age: 42
End: 2022-10-28

## 2022-10-28 VITALS — BODY MASS INDEX: 51.91 KG/M2 | HEIGHT: 63 IN | WEIGHT: 293 LBS

## 2022-10-28 VITALS
BODY MASS INDEX: 51.91 KG/M2 | WEIGHT: 293 LBS | HEART RATE: 96 BPM | DIASTOLIC BLOOD PRESSURE: 106 MMHG | SYSTOLIC BLOOD PRESSURE: 180 MMHG | RESPIRATION RATE: 20 BRPM | HEIGHT: 63 IN | TEMPERATURE: 98.2 F

## 2022-10-28 DIAGNOSIS — Z00.8 NUTRITIONAL ASSESSMENT: Primary | ICD-10-CM

## 2022-10-28 DIAGNOSIS — E66.01 MORBID OBESITY DUE TO EXCESS CALORIES (HCC): Primary | ICD-10-CM

## 2022-10-28 DIAGNOSIS — Z71.3 NUTRITIONAL COUNSELING: ICD-10-CM

## 2022-10-28 DIAGNOSIS — K21.9 GASTROESOPHAGEAL REFLUX DISEASE WITHOUT ESOPHAGITIS: ICD-10-CM

## 2022-10-28 PROCEDURE — 1036F TOBACCO NON-USER: CPT | Performed by: NURSE PRACTITIONER

## 2022-10-28 PROCEDURE — G8417 CALC BMI ABV UP PARAM F/U: HCPCS | Performed by: NURSE PRACTITIONER

## 2022-10-28 PROCEDURE — 99211 OFF/OP EST MAY X REQ PHY/QHP: CPT

## 2022-10-28 PROCEDURE — G8427 DOCREV CUR MEDS BY ELIG CLIN: HCPCS | Performed by: NURSE PRACTITIONER

## 2022-10-28 PROCEDURE — 99999 PR OFFICE/OUTPT VISIT,PROCEDURE ONLY: CPT | Performed by: SURGERY

## 2022-10-28 PROCEDURE — 99213 OFFICE O/P EST LOW 20 MIN: CPT | Performed by: NURSE PRACTITIONER

## 2022-10-28 PROCEDURE — G8484 FLU IMMUNIZE NO ADMIN: HCPCS | Performed by: NURSE PRACTITIONER

## 2022-10-28 NOTE — PROGRESS NOTES
Karan Ji  10/28/2022  [address]         Chief Complaint   Patient presents with    Results       Post-EGD Follow-up. Subjective:   Karan Ji is a 36 y.o. female post EGD done 2 weeks ago. She did not have a hiatal hernia, did have gastritis. Biopsy did not show Helicobacter pylori. The gallbladder ultrasound showed no stones. She does have a fatty enlarged liver. Patient has no concerns at this time. POSTOPERATIVE DIAGNOSES:   (1) No Hiatal Hernia, Effacement Hill Grade II  (2) No Esophagitis  (3) Mild Gastritis with some retained gastric contents  (4) No Duodenitis    Patient is planning on weight loss surgery with Dr. Clem Bolton    Weight: (!) 376 lb (170.6 kg). ROS  General: denies concerns  Heart: denies chest pain, pressure, palpitations  Lungs: denies shortness of breath, denies cough  Abdomen: denies n/v, constipation/diarrhea  Physical exam:    BP (!) 180/106 (Site: Left Lower Arm, Position: Sitting, Cuff Size: Medium Adult)   Pulse 96   Temp 98.2 °F (36.8 °C) (Temporal)   Resp 20   Ht 5' 3\" (1.6 m)   Wt (!) 376 lb (170.6 kg)   LMP 10/01/2022   BMI 66.61 kg/m²   General appearance: alert, appears stated age and cooperative  Lungs: clear to auscultation bilaterally  Heart: regular rate and rhythm  Abdomen: soft, non-tender; bowel sounds normal; no masses,  no organomegaly    Assessment:    Doing well two weeks post EGD      Plan:     1. Morbid obesity due to excess calories ([address]  See below    2. Gastroesophageal reflux disease without esophagitis  See below    Stay on the high protein, low calorie diet while waiting for insurance approval. Walk as much as possible but keep your legs elevated when sitting. Continue deep breathing exercizes. Aim for 60 gm Protein and 90 oz of liquids per day. Track protein and fluid intake. Make sure the bowel move daily by taking fiber such as Metamucil.     Provider Signature: Electronically signed by  Frankey Ambrosia Trinity Qureshi, APRN - CNP   Cc:  Pacheco Mancini MD

## 2022-10-28 NOTE — PROGRESS NOTES
Denies PONV    Diagnosis:   Stomach, antrum, biopsy:        - Antral mucosa showing mild chronic inactive inflammation (chronic   gastritis);         - Negative for Helicobacter pylori organisms by immunostaining.

## 2022-10-28 NOTE — PATIENT INSTRUCTIONS
[facility]  Dietary Initial Appointment Patient Instructions    By: Cassy Curiel RD / DEVIN  [phone]      At your initial dietary appointment you have received the following information packets:    Please be aware at each visit you have been instructed that in order for your insurance company to approve your surgery you must show a consistent weight loss of 2 lbs or greater at each visit. We can not guarantee an approval by your insurance company we can only provide the information given to us it is up to you the patient to show compliancy to your insurance company. If you do gain weight during your supervised weight loss counseling sessions insurance companies starting in 2018 are denying patients for not showing consistent weight loss results when part of a supervised weight loss counseling program. Pt was instructed on 10/28/22. Pt verbalized understanding. Low-Fat Diet  - Please be sure to begin your low-fat diet from today's date until your scheduled surgery date. If you are not at goal weight by your history and physical appointment with your surgeon your surgery will be cancelled. Goal Weight: 354 lbs BMI: 62.7  Low-Fat Diet Contract - Patient Acknowledge and Signed  Supplement List - Please be sure to be saving to purchase your 3 month supply of supplements. If you do not bring supplements to your history and physical appointment your surgery will be cancelled. Supplement Contract - Patient Acknowledge and Signed  Please be sure to take a daily Multi-vitamin from now until surgery to reduce your incidence of infection. If your insurance company requires additional dietary counseling you will be scheduled to see the dietitian the following month. If your psychological evaluation is completed and all your dietary requirements for your individual insurance company have been completed you will be submitted to insurance.  Please make sure to check with us to see if we have received your psychological evaluation. Please be aware that any co-pays or deductibles may be requested prior to testing and / or procedures. You will need to schedule a psychological evaluation for weight loss surgery. Patients will be required to complete all psychological testing as required by the psychologist. Patients must follow all of the psychologist's recommendations before weight loss surgery can be scheduled. The evaluation must be done a standard way for weight loss surgery. We strongly recommend that you contact one of our preferred providers listed below to arrange this:    Daxa Christy, [facility]                                                              [address] [place] [address]                                                                                      [phone]     Ladan Mancilla [address] [place] [address]                                                                                                [phone]        Dr. Sloane Grace, PhD                         Helen Ricardo. [place], [place]                                                                                              [phone]     You will also need to plan on attending a 2 hour nutrition class at [facility] prior to your surgery. We will schedule this for you when we schedule your surgery. Please remember to have your labs drawn prior to your scheduled appointment to review the results of your testing. Please remember, that while we will submit your case to insurance for surgery authorization, it is your responsibility to know if your plan covers weight loss surgery and keep up-to-date with changes to your insurance coverage.   We will do everything possible to help you get approved for weight loss surgery, but cannot guarantee an approval.      Please note that you will not be submitted to your insurance company until all   pre-operative testing requirements are met. Please remember you need to schedule to attend one Support Group meeting held at the [facility] before surgery. This one meeting is mandatory. You can attend as many support group meetings before and after surgery. Support group meetings are held at the [facility] from 6:00 - 8:00pm 1st, and 3rd Tuesday of every month. See dates listed below. Each individual person has his / her own medical requirements that need fulfilled before being able to schedule bariatric surgery . Please finalize these requirements by contacting our Bariatric Nurse at [phone].

## 2022-10-28 NOTE — PATIENT INSTRUCTIONS
What is the next step to proceed with weight loss surgery? Please be aware that any co-pays or deductibles may be requested prior to testing and / or procedures. You will need to schedule a psychological evaluation for weight loss surgery. Patients will be required to complete all psychological testing as required by the mental health provider. Patients must also follow all of the provider's recommendations before weight loss surgery can be scheduled. The evaluation must be done a standard way for weight loss surgery. We strongly recommend that you contact one of our preferred providers listed below to arrange this:      Jose Francisco Pagan, [facility]  [address] [place], [place]   [phone]    [facility] and [address]   [phone]    Dr. Ana Denny, PhD    Rosalee Peterson. [place], [place]    [phone]      You will also need to plan on attending a 2 hour nutrition class at the [facility] prior to your surgery. We will schedule this for you when we schedule your surgery. Please remember to have your labs drawn 10 days prior to your first scheduled dietary appointment. Please remember, that while we will submit your case to insurance for surgery authorization, it is your responsibility to know if your plan covers weight loss surgery and keep up-to-date with changes to your insurance coverage. We will do everything possible to help you get approved for weight loss surgery, but cannot guarantee an approval.     Please note that you will not be submitted to your insurance company until all pre-operative testing requirements are met.

## 2022-10-28 NOTE — PROGRESS NOTES
Lizy Augustin and [address] [facility]:  Initial Nutrition Consultation    Today's Date: 10/28/2022  Patients Name:Shereen Hare     Height: 5' 3\" (1.6 m)   Weight: (!) 376 lb (170.6 kg)   IBW: 147 lbs  %IBW: 256%  Excess Weight: 229 lbs  BMI: Body mass index is 66.61 kg/m². Subjective Information:   Patient has tried multiple means of weight loss including diet and exercise in the past and has been unable to maintain a healthy weight. Pt states he / she has tried the following  - 90 Day Work-Out, Intermittent Fasting and Keto Diet Plan. Patients overall goal is to be able to lose weight with diet and exercise by changing lifestyle, habits and maintain a healthy weight for a lifetime. Are your currently Diabetic  - No    Most successful diet in the past? Personalized Diet with Nutritionist  Length of time patient was on the diet listed above? 3/2022 - Current  Weight lost on the diet listed above? 20 lbs  Patient stated he / she maintained his / her weight for the following time? 1 year or less    Patient takes the following vitamins, minerals and dietary supplements? No - I do not currently take a daily complete multi-vitamin. Vaibhav Ingram / Everette Santana has educated the patient that we recommend in order to decrease infection rates patient start a daily complete multi-vitamin from now until surgery if proceeding with surgery. Patient consumes the following beverage daily? Water / Tea    Patient states he / she has the following problems:  no - Eating  no - Chewing  no - Swallowing  yes - Nausea  no - Vomiting  no - Diarrhea  yes - Constipation - Per pt etiology is medication and not consuming enough fiber from vegetables  yes - Hair Changes - Per pt etiology - Covid - 17, Old Age and PCOS  no - Missing Teeth  no - Wears Dentures  Food Allergies: no  -   Food Intolerances: yes - Lactose    Pt identifies his / her eating habits as the following:     Does not eat fruits or vegetables daily.  Never eats fruits. Never eats vegetables. Consumes high amounts of processed foods. Daily high intake of empty calorie foods (sweets, fatty / salty foods). Yes - Past medically assisted weight loss history reviewed. Yes - Provided education regarding the basic role of nutrition in junction with Bariatric Surgery. Yes - Provided overview of required dietary and behavioral changes pre and post operatively. Yes - Stated / reinforced that changes in dietary behaviors are critical to successful, long term weight Loss. Patient is aware that in order to proceed with bariatric surgery he / she will need to follow a low-fat diet prior to surgery. Patient is aware that bariatric surgery is a lifetime change that will require the patient to follow a low-fat, low-calorie, low-sugar, portion controlled diet with at least 30 minutes of physical activity daily. Patient is aware that certain foods may not be tolerated after bariatric surgery and certain foods will need avoided all together. Pt is aware supplementation of certain micro nutrients is lifelong along with the use of tracking both macro and micro nutrient intake daily after weight loss surgery. Pt is part of a comprehensive surgical weight loss program that is educating the patient on proper food choices, meal planning, portion control and label reading for use both before and after weight loss surgery. Pt is able to set measurable attainable nutrition goals that reinforce desired post-operative eating patterns when it comes to family, home based, or work settings. Mindful eating skills are being developed by the patient and how to identify the patients sense of hunger and fullness are being addressed.       [address] / LD feels that this patient knowledge / readiness to make appropriate diet / lifestyle changes assessed to be? - Good    RD / LD feels this patients expected adherence for post surgical diet? - Good    Patient was instructed and signed consents on a low-fat diet and required supplementation at initial consult. Comments: Patient is able to verbalize diet concepts for bariatric surgery. Patient is aware diet concepts will be reinforced at monthly pre-op weight loss counseling appointments. Pt will also attend a 3-hour nutrition education class once scheduled for surgery . RD / LD will monitor progress.

## 2022-11-15 ENCOUNTER — OFFICE VISIT (OUTPATIENT)
Dept: BARIATRICS/WEIGHT MGMT | Age: 42
End: 2022-11-15
Payer: COMMERCIAL

## 2022-11-15 VITALS
SYSTOLIC BLOOD PRESSURE: 159 MMHG | TEMPERATURE: 97 F | DIASTOLIC BLOOD PRESSURE: 90 MMHG | HEART RATE: 86 BPM | RESPIRATION RATE: 20 BRPM | HEIGHT: 63 IN | BODY MASS INDEX: 51.91 KG/M2 | WEIGHT: 293 LBS

## 2022-11-15 DIAGNOSIS — Z01.818 PRE-OP TESTING: ICD-10-CM

## 2022-11-15 DIAGNOSIS — E66.01 MORBID OBESITY DUE TO EXCESS CALORIES (HCC): Primary | ICD-10-CM

## 2022-11-15 DIAGNOSIS — Z71.3 NUTRITIONAL COUNSELING: ICD-10-CM

## 2022-11-15 DIAGNOSIS — K21.9 GASTROESOPHAGEAL REFLUX DISEASE WITHOUT ESOPHAGITIS: ICD-10-CM

## 2022-11-15 PROCEDURE — 1036F TOBACCO NON-USER: CPT | Performed by: NURSE PRACTITIONER

## 2022-11-15 PROCEDURE — 99211 OFF/OP EST MAY X REQ PHY/QHP: CPT

## 2022-11-15 PROCEDURE — G8427 DOCREV CUR MEDS BY ELIG CLIN: HCPCS | Performed by: NURSE PRACTITIONER

## 2022-11-15 PROCEDURE — G8484 FLU IMMUNIZE NO ADMIN: HCPCS | Performed by: NURSE PRACTITIONER

## 2022-11-15 PROCEDURE — 99213 OFFICE O/P EST LOW 20 MIN: CPT | Performed by: NURSE PRACTITIONER

## 2022-11-15 PROCEDURE — G8417 CALC BMI ABV UP PARAM F/U: HCPCS | Performed by: NURSE PRACTITIONER

## 2022-11-15 NOTE — PROGRESS NOTES
Sajan Eng  11/15/2022  Bariatric Weight loss appointment for Obesity  Nutrition Education Session      Subjective:   Sajan Eng is a 36 y.o. female here for pre-surgical dietary education today. Patient is accompanied by herself at today's visit. Patient reports that they are doing good following their previous dietary education. Questions today include successful things for weight loss. She reports that she has stopped drinking pop, sweet tea, iced coffee. She drinks water, unsweetened iced tea. Cut down on carbohydrates/bread. Weight=(!) 374 lb (169.6 kg)  Today's weight represents a total weight loss to date of 2 pounds. Today we will discuss the topics of weight loss. Patients previous 24 hour dietary recall includes:  Breakfast: 1/2 c fiber one cereal, carnation instant breakfast with soy milk  Snack: 1/2 cucumber with 1 tbsp homemade ranch  Lunch: 1/2 c tuna salad - tuna, 1tbsp. Light burnette, greek yogurt, dried cranberries and celery on low carb wrap  Snack: carrots  Dinner: michelena frozen meal - fettuccini michael with broccoli and chicken  Snack: none  Water intake: 48  Other beverages:none  Exercise: none    Prior to Admission medications    Medication Sig Start Date End Date Taking?  Authorizing Provider   omeprazole (PRILOSEC) 40 MG delayed release capsule Take 40 mg by mouth daily   Yes Historical Provider, MD   docusate sodium (COLACE) 100 MG capsule Take 100 mg by mouth 2 times daily as needed for Constipation   Yes Historical Provider, MD   ibuprofen (ADVIL;MOTRIN) 800 MG tablet Take 800 mg by mouth as needed for Pain   Yes Historical Provider, MD   acetaminophen (TYLENOL) 500 MG tablet Take 1,000 mg by mouth as needed for Pain   Yes Historical Provider, MD   fluticasone (FLONASE) 50 MCG/ACT nasal spray 1 spray by Each Nostril route as needed for Rhinitis   Yes Historical Provider, MD   DULoxetine (CYMBALTA) 60 MG extended release capsule Take 60 mg by mouth nightly   Yes Historical Provider, MD   losartan (COZAAR) 25 MG tablet Take 25 mg by mouth daily   Yes Historical Provider, MD   LORazepam (ATIVAN) 0.5 MG tablet Take 0.5 mg by mouth every 6 hours as needed for Anxiety. Yes Historical Provider, MD         Allergies   Allergen Reactions    Food      Lactose Intolerance    Tetracyclines & Related      Teeth turn yellow    Oxycodone Nausea And Vomiting     Past Medical History:   Diagnosis Date    Anxiety     Benign essential hypertension antepartum 2015    Cancer ([facility] [address] [facility]     skin- labia majora, back    Depression     Morbid obesity due to excess calories ([facility] [facility]     Obesity        Past Surgical History:   Procedure Laterality Date     SECTION      COLPOSCOPY      DILATION AND CURETTAGE      SKIN BIOPSY      UPPER GASTROINTESTINAL ENDOSCOPY N/A 10/20/2022    EGD BIOPSY performed by Mirna Azul MD at [place] [address]       Current Outpatient Medications   Medication Sig Dispense Refill    omeprazole (PRILOSEC) 40 MG delayed release capsule Take 40 mg by mouth daily      docusate sodium (COLACE) 100 MG capsule Take 100 mg by mouth 2 times daily as needed for Constipation      ibuprofen (ADVIL;MOTRIN) 800 MG tablet Take 800 mg by mouth as needed for Pain      acetaminophen (TYLENOL) 500 MG tablet Take 1,000 mg by mouth as needed for Pain      fluticasone (FLONASE) 50 MCG/ACT nasal spray 1 spray by Each Nostril route as needed for Rhinitis      DULoxetine (CYMBALTA) 60 MG extended release capsule Take 60 mg by mouth nightly      losartan (COZAAR) 25 MG tablet Take 25 mg by mouth daily      LORazepam (ATIVAN) 0.5 MG tablet Take 0.5 mg by mouth every 6 hours as needed for Anxiety. No current facility-administered medications for this visit. Allergies   Allergen Reactions    Food      Lactose Intolerance    Tetracyclines & Related      Teeth turn yellow    Oxycodone Nausea And Vomiting       History reviewed. No pertinent family history.     Social History     Socioeconomic History    Marital status: Single     Spouse name: Not on file    Number of children: Not on file    Years of education: Not on file    Highest education level: Not on file   Occupational History    Not on file   Tobacco Use    Smoking status: Never    Smokeless tobacco: Never   Substance and Sexual Activity    Alcohol use: No    Drug use: No    Sexual activity: Not on file   Other Topics Concern    Not on file   Social History Narrative    Not on file     Social Determinants of Health     Financial Resource Strain: Not on file   Food Insecurity: Not on file   Transportation Needs: Not on file   Physical Activity: Not on file   Stress: Not on file   Social Connections: Not on file   Intimate Partner Violence: Not on file   Housing Stability: Not on file           A complete 10 system review was performed and are otherwise negative unless mentioned in the above HPI. Specific negatives are listed below but may not include all those reviewed.     General ROS: negative obtundation, AMS  ENT ROS: negative rhinorrhea, epistaxis  Allergy and Immunology ROS: negative itchy/watery eyes or nasal congestion  Hematological and Lymphatic ROS: negative spontaneous bleeding or bruising  Endocrine ROS: negative  lethargy, mood swings, palpitations or polydipsia/polyuria  Respiratory ROS: negative sputum changes, stridor, tachypnea or wheezing  Cardiovascular ROS: negative for - loss of consciousness, murmur or orthopnea  Gastrointestinal ROS: negative for - hematochezia or hematemesis  Genito-Urinary ROS: negative for -  genital discharge or hematuria  Musculoskeletal ROS: negative for - focal weakness, gangrene  Psych/Neuro ROS: negative for - visual or auditory hallucinations, suicidal ideation    Physical exam:   BP (!) 159/90 (Site: Left Lower Arm, Position: Sitting, Cuff Size: Large Adult)   Pulse 86   Temp 97 °F (36.1 °C) (Temporal)   Resp 20   Ht 5' 3\" (1.6 m)   Wt (!) 374 lb (169.6 kg)   BMI 66.25 kg/m²     General appearance: AAO, NAD  Eyes: PERRL, EOMI, red conjunctiva  Lungs: Equal chest rise bilateral  Chest wall: atraumatic, no tenderness, no echymosis or abrasions  Heart: reg rate, no murmur  Abdomen: soft, nondistended, tender minimal, no hernias, no peritioneal signs  Extremities: full ROM all 4 ext, no gross motor or sensory deficits  Pulses: 2+ distal  Skin: warm and dry  Neurologic: spontanous eye opening, purposeful, follows complex commands  Psych: No tremor, no suicidal ideation, no hallucinations      Assessment:   1. Morbid obesity due to excess calories ([address]  See below    2. Nutritional counseling  Patient was instructed on types of protein supplements and usage of protein supplements before and after surgery. The goal of protein intake after bariatric surgery is 60-80 grams daily. Patient was able to verbalize the instruction of how to take the supplements and the importance of their use before and after surgery. 3. Gastroesophageal reflux disease without esophagitis  See below    4. Pre-op testing  - TSH  - URINE DRUG SCREEN; Future  - NICOTINE, BLOOD; Future      5th session of dietary education pre weight loss surgery. Patient has 1 more sessions to attend. Plan:   Continue to keep food diet, bring to next appointment with Nurse Practitioner or RD/LD  Continue to read food labels and make smart food choices  Increase protein and fluid intake as discussed in the Patient Guide to Bariatric Surgery  Increase physical activity at home    I have spent 20 minutes educating patient on nutrition. All questions were answered to patients and family's satisfaction. They verbalize the importance of pre surgical weight loss at this time.      Provider Signature: Electronically signed by SANDRA Bee - JOCE

## 2022-11-15 NOTE — PATIENT INSTRUCTIONS
What is the next step to proceed with weight loss surgery? Please be aware that any co-pays or deductibles may be requested prior to testing and / or procedures. You will need to schedule a psychological evaluation for weight loss surgery. Patients will be required to complete all psychological testing as required by the mental health provider. Patients must also follow all of the provider's recommendations before weight loss surgery can be scheduled. The evaluation must be done a standard way for weight loss surgery. We strongly recommend that you contact one of our preferred providers listed below to arrange this:      Jose Francisco Robertson, [facility]  [address] [place], [place]   [phone]    [facility] and [address]   [phone]    Dr. Sybil Hunt, PhD    Magda Sutherland. [place], [place]    [phone]      You will also need to plan on attending a 2 hour nutrition class at the [facility] prior to your surgery. We will schedule this for you when we schedule your surgery. Please remember to have your labs drawn 10 days prior to your first scheduled dietary appointment. Please remember, that while we will submit your case to insurance for surgery authorization, it is your responsibility to know if your plan covers weight loss surgery and keep up-to-date with changes to your insurance coverage. We will do everything possible to help you get approved for weight loss surgery, but cannot guarantee an approval.     Please note that you will not be submitted to your insurance company until all pre-operative testing requirements are met.

## 2022-12-15 ENCOUNTER — TELEPHONE (OUTPATIENT)
Dept: BARIATRICS/WEIGHT MGMT | Age: 42
End: 2022-12-15

## 2022-12-15 ENCOUNTER — HOSPITAL ENCOUNTER (OUTPATIENT)
Age: 42
Discharge: HOME | End: 2022-12-15
Payer: COMMERCIAL

## 2022-12-15 ENCOUNTER — OFFICE VISIT (OUTPATIENT)
Dept: BARIATRICS/WEIGHT MGMT | Age: 42
End: 2022-12-15

## 2022-12-15 VITALS — HEIGHT: 63 IN | WEIGHT: 293 LBS | BODY MASS INDEX: 51.91 KG/M2

## 2022-12-15 DIAGNOSIS — Z71.3 NUTRITIONAL COUNSELING: ICD-10-CM

## 2022-12-15 DIAGNOSIS — Z01.818 PRE-OP TESTING: ICD-10-CM

## 2022-12-15 DIAGNOSIS — Z00.8 NUTRITIONAL ASSESSMENT: Primary | ICD-10-CM

## 2022-12-15 LAB
AMPHETAMINE SCREEN, URINE: NOT DETECTED
BARBITURATE SCREEN URINE: NOT DETECTED
BENZODIAZEPINE SCREEN, URINE: NOT DETECTED
CANNABINOID SCREEN URINE: NOT DETECTED
COCAINE METABOLITE SCREEN URINE: NOT DETECTED
FENTANYL SCREEN, URINE: NOT DETECTED
Lab: NORMAL
METHADONE SCREEN, URINE: NOT DETECTED
OPIATE SCREEN URINE: NOT DETECTED
OXYCODONE URINE: NOT DETECTED
PHENCYCLIDINE SCREEN URINE: NOT DETECTED
TSH SERPL DL<=0.05 MIU/L-ACNC: 0.82 UIU/ML (ref 0.27–4.2)

## 2022-12-15 PROCEDURE — 80307 DRUG TEST PRSMV CHEM ANLYZR: CPT

## 2022-12-15 PROCEDURE — 36415 COLL VENOUS BLD VENIPUNCTURE: CPT

## 2022-12-15 PROCEDURE — 84443 ASSAY THYROID STIM HORMONE: CPT

## 2022-12-15 PROCEDURE — G0480 DRUG TEST DEF 1-7 CLASSES: HCPCS

## 2022-12-15 NOTE — PROGRESS NOTES
Weight Loss Assessment: Completed by Nutrition Services RD/LD Certified in Adult Weight Management:  Phone Number:  [phone]  Fax Number:   [phone]    Moiz Toro   12/15/22  Weight Loss Appointment: 6th Weight Loss Appointment      Wt Readings from Last 3 Encounters:   12/15/22 (!) 370 lb (167.8 kg)   11/15/22 (!) 374 lb (169.6 kg)   10/28/22 (!) 376 lb (170.6 kg)        (!) 370 lb (167.8 kg)  IBW: 147 lbs         % IBW: 252%       % EBWL: 3%           ABW: 203 lbs  % ABW: 182%       BMI: Body mass index is 65.54 kg/m². Patient's 24 Hour Recall:  Breakfast: 2 Egg's, Whole Wheat Toast and 1/2 Avocado, Coffee  Snack: None  Lunch: Canned Chicken, Tbsp Lite [place], 2 Stalks Celery with Cranberries - Chicken Salad, Water  Snack: 2 Cheese Stick's  Dinner: Healthy Choice Steam Fresh Marsala Chicken  Snack: None  Water Intake: 45 oz  Other Beverages: Soy Milk  Exercise: ADL's - Pt is walking the parking lot when getting the mail - 2 time's around. Weight loss goal for next follow-up appointment: 30 lbs    Education:  1. Weigh yourself daily and record it. 2. Keep documented food records daily   3. 220-225 minutes a week of moderate physical activity   4. Just be more active in day to day routine   5. Higher protein intake and a higher fiber intake. Not a high protein or a high fiber diet just a higher intake.     Ezekiel Edouard addressed the following with the pt:  - Ezekiel Edouard enc pt to comply with nutrition recommendations  - Ezekiel Edouard enc to go back to maintenance of regular physical activity  - Periodic assessment to prevent and treat eating or other psychiatric disorders   - Ezekiel / Javan enc participation in support group meetings  - Ezekiel Edouard enc pt to go back to maintenance of daily food records and weight monitoring records   - Ezekiel Edouard reviewed the importance of adequate sleep and stress management  - Ezekiel Edouard reviewed nonfood strategies to cope with emotions and stress  - Ezekiel Edouard encouraged pt to practice the following: Mindful eating: Eating slowly: Focusing on the eating experience without distraction  - Ezekiel Edouard enc. pt to pay attention to hunger and fullness cues  - Rd / Ld enc meal planning  - Rd / Ld  enc pt to chose nutrient dense whole foods instead of soft, high calorie foods  - Rd / Ld enc not drinking large amounts of fluids with or immediately after meals    Portion control, meal planning and avoiding empty calorie consumption. Patient has established the following three goals for the next follow-up appointment. 1. Pt states she wants to increase non-starchy vegetable consumption within her diet. 2. Pt states she wants to increase water intake. Pt states she may add fresh fruit to her water to help increase water intake and improve taste. Patient was instructed on the importance of increasing water intake to 48 - 64 oz. of water total daily. Pt. was also instructed he / she is allowed an additional 30 oz. of sugar free caffeine free clear liquid beverages for a total of 90 oz. of fluid total daily. Pt. was able to verbalize how he / she can get more water and fluids within the diet. Pt. verbalized understanding. 3. Pt states she wants to start using vegetable noodles to replace CHO's within her diet. Patient has established the following exercise goal for next follow-up appointment:  Exercise: ADL's - Pt is walking the parking lot when getting the mail - 4 to 6 time's around. Did the patient keep food records:No -  Pt was instructed by the Ezekiel Edouard that she / he needs to keep daily food records and bring the food records to all f/u appointments. Pt was instructed this is an important part of compliancy and long-term lifestyle changes and will help establish long-term weight loss and weight maintenance success after weight loss surgery. Ezekiel Edouard reviewed with the pt how to keep track of protein intake along with calories, fat and sugar content.   Pt needs to be able to see that he / she is meeting his / her macro nutrient needs daily. Ezekiel Edouard reviewed different ways to keep foods records either manually or with computer apps. Pt was able to verbalize understanding. Failure to keep food records show poor compliancy following weight loss surgery. Pt has been given all the tools and education necessary to complete this task. Education spent with pt just on food records 20 minutes. Pt. is aware if they do not comply with [facility] Guidelines that this can lead to the patient being dismissed from the program.    The registered dietitian spent the following time 60 minutes educating the patient and providing the patient with nutritional handouts to follow. __________________________________________________________________________________  Primary Care Physician Follow-up:  Pt. was seen by Jose Rafael Hernandez RD/DEVIN regarding weight loss education and follow-up on 12/15/22. This was the patients 4th appointment with the registered dietitian. The registered dietitian spent the following amount of time with the patient 60 minutes. Please All[place]t the following: The Primary Care Physician reviewed the above nutrition assessment and patient education and agrees with current diet plan. The Primary Care Physician wants the current diet plan changed to the following:_____________________________________________________________________________________________________________________________________________________________________________________________________________. Physician Signature:__________________________ Date:______________  Once signed please fax back to the [facility] [phone]. We thank you for allowing us to participate in your patients care.

## 2022-12-15 NOTE — TELEPHONE ENCOUNTER
Ezekiel Edouard called the pt and scheduled the pt for the following. Pt is aware he/she needs to be down to their pre-op weight loss goal when they come in for their weight check or their sx will be cx. Pt verbalized understanding. Pre-op weight loss goal reviewed. Pt scheduled for the following see below. Pt is aware supplements are cash, check, credit or debt card. SX Type: RYGB  SX Date: ???  H&P Appt: Dr. Rocky Chavez ???  Weight Check Appt: This will occur after the 3 hour class at the [facility]  3 Hour Class: At the [facility] From 8:00 - 11:00 am on - RYGB 12/21/22  Supplements: Pt was provided a handout on approved protein supplements for use after WLS. (Handout - given 12/15/22)Pt was instructed he / she will need to bring his / her protein supplements to the class in order to move forward with sx or sx can be cx. Handouts reviewed and provided. Pt verbalized understanding. Pt will purchase the Bariatric approved MVI, Fe+, Ca+ and Vit D at the [facility]. 2 Hour Pt Education Book: Pt needs      Pt was instructed he / she can call any time with questions. Goal Weight 354 lbs - Pt has copy of pre-op diet. Enc pt to follow pre-op diet to get down to pre-op weight loss goal. Pt verbalized understanding.   Pt is aware sx can be cx by his / her surgeon at H&P appt if he / she feels pt has not achieved pre-op weight loss goal.

## 2022-12-15 NOTE — TELEPHONE ENCOUNTER
Last Dietary Appointment Notes: 12/15/22    [address]: [place] [facility]    Surgery Requested by Patient: As of 12/15/22 -RYGB    Date: 2 Hour Nutrition Class: Once all testing is complete    Rd / Ld reviewed the following with the patient:    Rd / Ld at the [facility] reviewed with the patient that he / she has not completed the following in order to proceed with bariatric surgery:     - Initial Appt with Surgeon was 22. Initial Appt is only good until 23. Testing will be required again after this date per your insurance company policy. Please remember just because you finished all of your requirements if you did not finish the requirements in a timely manner they can  and you can be required to complete these requirements over again. Each [employer] has its own set of requirements with its own set of deadlines. Once everything listed below is completed you will need to complete the following to proceed with sx. The [facility] will contact you to complete this process. You will be called in order to select your surgery date for insurance submission. You will be called and scheduled to attend a 3 Hour Nutrition Class on the type of surgery you are having completed. These are always scheduled on  from 8:00 am to 11:00 am and dates vary depending on the type of surgery you are having completed. You will need to purchase your bariatric supplements at this appointment cost is $140.00 to $240.00. Failure to purchase supplements or attend the class will lead to your surgery being cancelled. You will need final Medical Clearance from your Primary Care Physician. Failure to complete will lead to your surgery being cancelled. You will be scheduled for a H&P appointment with your surgeon . It is at this appointment you will need to make goal weight. Failure to complete will lead to your surgery being cancelled.     You will be scheduled for PAT at [facility] the week before your scheduled surgery. Failure to complete will lead to your surgery being cancelled. Remember after all testing that is required it is your responsibility as a patient to call The [facility] to review that we received any testing results or requirements that you had completed. The [facility] is not responsible for tracking of results and testing. Your phone call will help facilitate if what is required was received and completed. - Nicotine - Awaiting results pt went today. - Pt needs Med Clearance first - Chart to [address] / Ld at the [facility] reviewed that he / she is not at his / her pre-surgery goal weight of 354 lbs. Patient currently weighs 370 lbs and must return to the [facility] for a weight check on H&P before the patient can be scheduled for surgery. This has been reviewed with the patient and the patient is in agreement. Rd / Ld reviewed with the patient that he / she must purchase a 3 month supply of supplements before his / her surgery or at the time of his / her H&P appointment and the patient states he / she is going to purchase the 3 month supply of supplements on H&P. Patient is aware that failure to purchase the supplements at this appointment will cancel the patients surgery date. Patient states at this time from the time of his / her initial consult here at the [facility] there has been no changes in his / her medical history. Patient is aware failure to disclose information can lead to his / her surgery being cancelled. Patient received a copy of this at the time of his / her final dietary consult.

## 2022-12-18 LAB
COTININE: <5 NG/ML
NICOTINE: <5 NG/ML

## 2022-12-19 ENCOUNTER — TELEPHONE (OUTPATIENT)
Dept: BARIATRICS/WEIGHT MGMT | Age: 42
End: 2022-12-19

## 2022-12-19 NOTE — LETTER
Medical Clearance / Medical Necessity for Bariatric Surgery    Name: Rosalio Cardona     YOB: 1980    I have been caring for the above patient for _____ years. He/she suffers from the following medical conditions:  ____________________________________________________________________________________________________________________________________________    The above medical conditions are either caused by or worsened by the patients morbid obesity. Yes_________ No__________    The above medical conditions are currently stable:      Yes_________ No__________    The following medical conditions are difficult to manage/require multiple medications to achieve control due to the patients weight:   ____________________________________________________________________________________________________________________________________________                    The above patient has participated in the following medical weight loss efforts without long-term weight reduction:  ____________________________________________________________________________________________________________________________________________        I am in support of my patient undergoing a weight loss surgical procedure with [place] [address] [facility] and the patient understands the risks and benefits of weight loss surgery. The patient has reasonable expectations and I believe the patient will be compliant with all post-surgical requirements. I understand the program is comprehensive with dedicated and specially trained staff. In the event that my patient is over the age of 61, I would like to state that the patients physiological age and co-morbid conditions result in a positive risk to benefit ratio.    _______  In my medical opinion, there are no medical contraindications to proceed with gastric sleeve surgery and patients benefits of bariatric surgery outweigh the risks of bariatric surgery.     ________ I understand that extended-release medications are not recommended after bariatric surgery and, if possible, I will change my patients medications to another option. If my patient is diabetic, we will establish a plan for medications and blood sugar checks for post-surgery. These medications may need to be adjusted or stopped after weight loss surgery.  The plan after surgery will be:  ________________________________________________________________________________________________________________________________________________________________________________________________________________________________________________________________________________________        If you do not currently manage your patients diabetic medications, what physician does? _____________________________________________________________________    Physician Name (Please Print): __________________________________    Primary Care Physicians Signature: __________________________________    Date: ______/______/______

## 2022-12-19 NOTE — PROGRESS NOTES
Patient attended a 3 Hour Initial Nutrition Consult Class for RYGB on 12/21/22. Patient was able to verbalize understanding of the class. [facility]  Nutrition History and Physical     Sully Flores    Surgery Type: RYGB  Today's Date: 12/21/22    yes  Patient attended a 3 hour nutrition education class on 12/21/22, and was given written educational material.  Patient signed and verbalized understanding of nutrition therapy for Bariatric Surgery. Assessment:              Vitals:    12/21/22 1108   Weight: (!) 375 lb (170.1 kg)   Height: 5' 3\" (1.6 m)    Height: 5' 3\" (1.6 m) Weight: (!) 375 lb (170.1 kg)   BMI:Body mass index is 66.43 kg/m². Food Allergies and Allergies: No    Food Intolerances: Yes - Lactose Intolerance - All Dairy - Nausea and Gas   Eating Problems:No  Chewing Problems:No  Swallowing Problems:No    Current Vitamins/Supplements and Medications:  Current Outpatient Medications:     omeprazole (PRILOSEC) 40 MG delayed release capsule, Take 40 mg by mouth daily, Disp: , Rfl:     docusate sodium (COLACE) 100 MG capsule, Take 100 mg by mouth 2 times daily as needed for Constipation, Disp: , Rfl:     ibuprofen (ADVIL;MOTRIN) 800 MG tablet, Take 800 mg by mouth as needed for Pain, Disp: , Rfl:     acetaminophen (TYLENOL) 500 MG tablet, Take 1,000 mg by mouth as needed for Pain, Disp: , Rfl:     fluticasone (FLONASE) 50 MCG/ACT nasal spray, 1 spray by Each Nostril route as needed for Rhinitis, Disp: , Rfl:     DULoxetine (CYMBALTA) 60 MG extended release capsule, Take 60 mg by mouth nightly, Disp: , Rfl:     losartan (COZAAR) 25 MG tablet, Take 25 mg by mouth daily, Disp: , Rfl:     LORazepam (ATIVAN) 0.5 MG tablet, Take 0.5 mg by mouth every 6 hours as needed for Anxiety. , Disp: , Rfl:   _    Please check all that apply:  No - Patient did not lose 10% of excess body weight prior to surgery  Yes - Patient  is able to verbalize a Bariatric Full Liquid Diet.  Yes - Patient is able to verbalize the usage & importance of Protein Supplements. Yes- Patient purchased 3 month supply of protein vitamins and calcium. YES - Patient is instructed to follow a low-fat pre-op diet from now until surgery date. YES - Patient is instructed to take at least 30 grams of a protein supplement from  now until surgery date in addition to low-fat diet guidelines. YES - Patient is instructed to consume 64 ounces of water daily from now until surgery date.  ________________________________________________________________________  Yes - Patient did not lose 10% of excess body weight prior to surgery   - Patient has to follow the VLCD Diet and return to [facility] for weigh-in on H&P   - At weigh in appointment patient must weigh 354 lbs or surgery can be cancelled. ________________________________________________________________________  Yes - Patient did purchase 3 month supply of protein, vitamins and Calcium.     Comments:   [facility] Supplements  - Garden of Life 20 grams per scoop

## 2022-12-19 NOTE — TELEPHONE ENCOUNTER
Preparing for insurance submission and need medical clearance/LOMN first. Discussed with patient. 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

## 2022-12-21 ENCOUNTER — INITIAL CONSULT (OUTPATIENT)
Dept: BARIATRICS/WEIGHT MGMT | Age: 42
End: 2022-12-21

## 2022-12-21 ENCOUNTER — TELEPHONE (OUTPATIENT)
Dept: BARIATRICS/WEIGHT MGMT | Age: 42
End: 2022-12-21

## 2022-12-21 VITALS — BODY MASS INDEX: 51.91 KG/M2 | WEIGHT: 293 LBS | HEIGHT: 63 IN

## 2022-12-21 DIAGNOSIS — Z00.8 NUTRITIONAL ASSESSMENT: Primary | ICD-10-CM

## 2022-12-21 DIAGNOSIS — Z71.3 NUTRITIONAL COUNSELING: ICD-10-CM

## 2022-12-21 PROCEDURE — 99999 PR OFFICE/OUTPT VISIT,PROCEDURE ONLY: CPT | Performed by: SURGERY

## 2022-12-21 NOTE — LETTER
Date: 12-21-22      [employer]:  Attention Prior Authorization    Regarding:  Mars Yanes  Member ID:  [id number]    Request:     Authorization for CPT 68716  (Laparoscopic Sd-en-Y)                      Diagnosis Codes:  E66.01; I10; G47.33; E28.2; E78.5    Physician: Nicole Vuong M.D.  [address] TIN [id number])           (NPI [id number])    Facility: [address] [place] [address] [id number]    [facility] [address] (NPI [id number])    [facility] [place] [address]       Dear Zack Meter or Madam:     Pre-determination of insurance coverage, and authorization for hospitalization and surgical treatment are requested on behalf of your annuitant Mars Yanes for diagnoses of morbid obesity, hypertension, obstructive sleep apnea, PCOS and hyperlipidemia. Mars Yanes is 5'3, weighs 370 lb., and has a BMI of 65.5. She has been severely obese for a number of years despite many years of dietary efforts. She has lost weight through these efforts, however has been unable to maintain satisfactory weight loss. Mars Yanes has been evaluated in our bariatric program and is felt to be an excellent candidate  for surgery. The patient has undergone extensive pre-operative education and understands all the risks, benefits and possible complications of surgery. She has also undergone thorough nutritional evaluation and counseling with our registered dietician. Our program provides long term nutritional counseling with unlimited consults with the dietician. This patient is not currently pregnant, and a pregnancy test will be done the morning of surgery to confirm. All female patients have been educated on the importance of using reliable birth control and avoiding pregnancy for the first 2 years following bariatric surgery. All patients are nicotine tested and require a negative nicotine level prior to surgery.   Patient has been educated about the risks associated with substance use, as well as the risks of using nicotine and alcohol after surgery. Patient has been educated that these substances need to be avoided lifelong after surgery to reduce the risk of complications and sub-optimal weight loss. We are accredited as a [address] through [facility] and as such we have a multidisciplinary preoperative program. Marvin Klein did participate and comply fully with the preoperative preparatory program which included surgical evaluation, medical workup and clearance, psychological evaluation and clearance, nutritional evaluation and 6 months of provider supervised dietary counseling. She has also attended pre-operative support group meetings facilitated by our LISW and is encouraged to continue attendance post-operatively. I am requesting authorization for Laparoscopic Sd-en-Y Gastric Bypass, procedure code 99094, with a hospital stay of 1 day, to be performed for the treatment of the patients severe and life threatening diseases. This procedure will be performed at [address] [place] [address], [facility]. I appreciate your consideration in this matter and your timely response. Supporting clinical documents are included with this request.    Electronically signed by Dr. Mansi Weinberg M.D.   Bariatric Surgery

## 2022-12-21 NOTE — PATIENT INSTRUCTIONS
The [facility] and [address] [place] [address] [facility]  Dietary Follow-up Appointment Instructions    Martha Patrick   Date: 12/21/2022     The RD / LD reviewed the following instructions with the patient and handouts have been given. Pt. is able to verbalize instruction and has been instructed to call with any problems or complications. If patient is not able to comply with the dietary or supplement instructions given pt. is instructed to call the [facility] at [phone]. Patient has been instructed to continue to follow a low-fat diet from today's date until the day before surgery. Patient has been instructed to drink 64 oz. of water daily eliminating carbonated and caffeinated beverages.     ________________________________________________________________________  10% Of Excess Body Weight Requirement:    Since patient did not lose 10% of excess body weight the patient has been instructed to follow The VLCD Diet and return [facility] the [facility] on H&P    At weigh in appointment patient must weigh 354 lbs  ________________________________________________________________________

## 2022-12-21 NOTE — TELEPHONE ENCOUNTER
Medical clearance received. Ready for insurance submission, discussed with patient. Case prepared and submitted online to [facility] with request for LRYGB 2-. Online confirmation received.

## 2022-12-28 ENCOUNTER — PREP FOR PROCEDURE (OUTPATIENT)
Dept: BARIATRICS/WEIGHT MGMT | Age: 42
End: 2022-12-28

## 2022-12-28 ENCOUNTER — TELEPHONE (OUTPATIENT)
Dept: BARIATRICS/WEIGHT MGMT | Age: 42
End: 2022-12-28

## 2022-12-28 NOTE — TELEPHONE ENCOUNTER
Per order of Dr. Gibbs So patient is ready to be scheduled for LRYGB. Call placed to patient and she is in agreement with surgery on 2-21-23. Pre-op class is completed scheduled and purchase supplements at that visit. Pre-op letter will be mailed. She is working towards pre-op weight loss goal.   She does not smoke and will refrain from alcohol use. We reviewed at length all meds to avoid 2 weeks prior to surgery and patient voiced understanding. This list is in the pre-op letter which will be mailed to patient. Reviewed with patient instructions for Gatorade the night before surgery. Included written instructions with pre-op letter with consideration if patient is on insulin. She uses a CPAP with a setting of unsure of settings. She has final medical clearance. Instructed to discuss with PCP extended release medications. Patient placed on [employer] calendar. Patient surgery ordered in [employer]. Patient denies latex allergy. Patient confirms PONV.

## 2023-01-30 RX ORDER — OMEPRAZOLE 20 MG/1
20 CAPSULE, DELAYED RELEASE ORAL DAILY
Qty: 30 CAPSULE | Refills: 12 | Status: CANCELLED | OUTPATIENT
Start: 2023-01-30 | End: 2024-01-30

## 2023-02-10 ENCOUNTER — OFFICE VISIT (OUTPATIENT)
Dept: BARIATRICS/WEIGHT MGMT | Age: 43
End: 2023-02-10

## 2023-02-10 VITALS
SYSTOLIC BLOOD PRESSURE: 159 MMHG | TEMPERATURE: 98 F | RESPIRATION RATE: 20 BRPM | BODY MASS INDEX: 51.91 KG/M2 | HEIGHT: 63 IN | DIASTOLIC BLOOD PRESSURE: 99 MMHG | HEART RATE: 97 BPM | WEIGHT: 293 LBS

## 2023-02-10 DIAGNOSIS — Z98.890 PONV (POSTOPERATIVE NAUSEA AND VOMITING): ICD-10-CM

## 2023-02-10 DIAGNOSIS — R11.2 PONV (POSTOPERATIVE NAUSEA AND VOMITING): ICD-10-CM

## 2023-02-10 DIAGNOSIS — K21.9 GASTROESOPHAGEAL REFLUX DISEASE WITHOUT ESOPHAGITIS: ICD-10-CM

## 2023-02-10 DIAGNOSIS — K91.2 MALNUTRITION FOLLOWING GASTROINTESTINAL SURGERY: Primary | ICD-10-CM

## 2023-02-10 RX ORDER — ONDANSETRON 4 MG/1
4 TABLET, ORALLY DISINTEGRATING ORAL EVERY 8 HOURS PRN
Qty: 15 TABLET | Refills: 0 | Status: SHIPPED | OUTPATIENT
Start: 2023-02-10

## 2023-02-10 NOTE — PROGRESS NOTES
Marcos Alberto  2/10/2023  [id number]  [facility]  [facility]               History and Physical  Gastric Bypass     CHIEF COMPLAINT: Morbid obesity, Hypertension, Obstructive Sleep Apnea, Dyspnea on Exertion, Depression, Extremity Edema, Non-alcoholic Steatohepatitis (SAHU), Polycystic Ovarian Syndrome, and Venous Stasis Disease    HISTORY OF PRESENT ILLNESS: Marcos Alberto is a morbidly-obese 36 y.o.  female, who weighs (!) 363 lb (164.7 kg). She is 216 pounds over her ideal body weight. The Body mass index is 64.3 kg/m². She has lost 12 pounds over the past several months in preparation for surgery. She has multiple medical problems aggravated by her obesity. She wishes to have a gastric bypass so that she can lose more weight and keep the weight off. I have met with her on 2 different occasions in the [facility], where we discussed the surgery in great detail and went over the risks and benefits. She has watched our informational video so she understands all of the extensive risks involved. She states that she understands all of these risks and wishes to proceed.     POSTOPERATIVE DIAGNOSES:   (1) No Hiatal Hernia, Effacement Hill Grade II  (2) No Esophagitis  (3) Mild Gastritis with some retained gastric contents  (4) No Duodenitis    Allergies   Allergen Reactions    Food Nausea Only and Other (See Comments)     Lactose Intolerance - All Dairy    Tetracyclines & Related      Teeth turn yellow    Oxycodone Nausea And Vomiting       Current Outpatient Medications on File Prior to Visit   Medication Sig Dispense Refill    omeprazole (PRILOSEC) 40 MG delayed release capsule Take 40 mg by mouth daily      docusate sodium (COLACE) 100 MG capsule Take 100 mg by mouth 2 times daily as needed for Constipation      ibuprofen (ADVIL;MOTRIN) 800 MG tablet Take 800 mg by mouth as needed for Pain      acetaminophen (TYLENOL) 500 MG tablet Take 1,000 mg by mouth as needed for Pain      fluticasone (FLONASE) 50 MCG/ACT nasal spray 1 spray by Each Nostril route as needed for Rhinitis      DULoxetine (CYMBALTA) 60 MG extended release capsule Take 60 mg by mouth nightly      losartan (COZAAR) 25 MG tablet Take 25 mg by mouth daily      LORazepam (ATIVAN) 0.5 MG tablet Take 0.5 mg by mouth every 6 hours as needed for Anxiety. No current facility-administered medications on file prior to visit. Past Medical History:   Diagnosis Date    Anxiety     Benign essential hypertension antepartum 2015    Cancer ([facility] [address]     skin- labia majora, back    Depression     Morbid obesity due to excess calories ([facility] [address]     Obesity        Past Surgical History:   Procedure Laterality Date     SECTION      COLPOSCOPY      DILATION AND CURETTAGE      SKIN BIOPSY      UPPER GASTROINTESTINAL ENDOSCOPY N/A 10/20/2022    EGD BIOPSY performed by Evan Bermeo MD at [place] [address]       Current Outpatient Medications   Medication Sig Dispense Refill    ondansetron (ZOFRAN-ODT) 4 MG disintegrating tablet Take 1 tablet by mouth every 8 hours as needed for Nausea or Vomiting 15 tablet 0    omeprazole (PRILOSEC) 40 MG delayed release capsule Take 40 mg by mouth daily      docusate sodium (COLACE) 100 MG capsule Take 100 mg by mouth 2 times daily as needed for Constipation      ibuprofen (ADVIL;MOTRIN) 800 MG tablet Take 800 mg by mouth as needed for Pain      acetaminophen (TYLENOL) 500 MG tablet Take 1,000 mg by mouth as needed for Pain      fluticasone (FLONASE) 50 MCG/ACT nasal spray 1 spray by Each Nostril route as needed for Rhinitis      DULoxetine (CYMBALTA) 60 MG extended release capsule Take 60 mg by mouth nightly      losartan (COZAAR) 25 MG tablet Take 25 mg by mouth daily      LORazepam (ATIVAN) 0.5 MG tablet Take 0.5 mg by mouth every 6 hours as needed for Anxiety. No current facility-administered medications for this visit.        Allergies   Allergen Reactions    Food Nausea Only and Other (See Comments)     Lactose Intolerance - All Dairy    Tetracyclines & Related      Teeth turn yellow    Oxycodone Nausea And Vomiting       History reviewed. No pertinent family history.     Social History     Socioeconomic History    Marital status: Single     Spouse name: Not on file    Number of children: Not on file    Years of education: Not on file    Highest education level: Not on file   Occupational History    Not on file   Tobacco Use    Smoking status: Never    Smokeless tobacco: Never   Substance and Sexual Activity    Alcohol use: No    Drug use: No    Sexual activity: Not on file   Other Topics Concern    Not on file   Social History Narrative    Not on file     Social Determinants of Health     Financial Resource Strain: Not on file   Food Insecurity: Not on file   Transportation Needs: Not on file   Physical Activity: Not on file   Stress: Not on file   Social Connections: Not on file   Intimate Partner Violence: Not on file   Housing Stability: Not on file       Review of Systems  General ROS: negative for - chills, fatigue or malaise  ENT ROS: negative for - hearing change, nasal congestion or nasal discharge  Allergy and Immunology ROS: negative for - hives, itchy/watery eyes or nasal congestion  Hematological and Lymphatic ROS: negative for - blood clots, blood transfusions, bruising or fatigue  Endocrine ROS: negative for - malaise/lethargy, mood swings, palpitations or polydipsia/polyuria  Respiratory ROS: negative for - sputum changes, stridor, tachypnea or wheezing  Cardiovascular ROS: negative for - irregular heartbeat, loss of consciousness, murmur or orthopnea  Gastrointestinal ROS: negative for - constipation, diarrhea, gas/bloating, heartburn or hematemesis  Genito-Urinary ROS: negative for -  genital discharge, genital ulcers or hematuria  Musculoskeletal ROS: negative for - gait disturbance, muscle pain or muscular weakness  Psychiatric ROS: negative for - visual or auditory hallucinations, suicidal ideation      Physical Exam:   Vitals: BP (!) 159/99 (Site: Left Lower Arm, Position: Sitting, Cuff Size: Large Adult)   Pulse 97   Temp 98 °F (36.7 °C) (Temporal)   Resp 20   Ht 5' 3\" (1.6 m)   Wt (!) 363 lb (164.7 kg)   BMI 64.30 kg/m²     General appearance: AAO, NAD  Eyes: PERRL, EOMI, red conjunctiva  Lungs: CTAB, no wheeze, no rhonchi  Chest wall: atraumatic, no tenderness, no echymosis or abrasions  Heart: reg rate, no murmur  Abdomen: soft, nondistended, nontender  Extremities: full ROM all 4 ext, no gross motor or sensory deficits  Pulses: 2+ distal  Skin: warm and dry  Neurologic: spontanous eye opening, purposeful, follows complex commands  Psych: No hallucinations      Assessment:  Morbid obesity with failure of conservative therapy. Patient has been cleared psychologically and medically. The gall bladder ultrasound was evident of fatty live. Upper endoscopy showed no hiatal hernia. The patient was informed that risks include, but are not limited to: death, anastomotic leak, obstruction, bleeding, and sepsis. Any of these could require further surgery. Other risks include heart attack, DVT, PE, pneumonia, hernia, wound infection, the need for dilatations of the gastrojejunostomy, and the inability to lose appropriate weight and keep it off. We may not be able to do a Gastic Bypass, in that case we will do a Sleeve Gastrectomy. We discussed that our goal is to ameliorate the medical problems and not to obtain a specific body mass index. She understands the risks and benefits and wishes to proceed with the procedure. She has signed a consent form. Plan:  (55857) Laparoscopic Sd-en-Y Gastric Bypass possible hiatal hernia repair. She does not need Lovenox post-op.     Physician Signature: Electronically signed by Dr. Ellie Shepherd MD    Send copy of H&P to PCP, Gwen Soto MD

## 2023-02-10 NOTE — PATIENT INSTRUCTIONS
Please follow the instruction sheets you received today for your pre-surgical skin and bowel prep. It is very important that your abdomen is properly cleaned and your bowel is cleansed of stool prior to surgery to decrease the chance of any complications. Make sure that you do not eat food or drink fluids after midnight prior to your surgery. If you eat or drink within 8 hours of your surgery, your procedure will be cancelled. Hold your medications as well unless you receive special instruction from either your surgeon or the anesthesiologist to take one of your medications with a small sip of water. Please be advised that most patients are now released from the hospital the day after surgery, regardless of procedure (Band, Bypass, or Sleeve), if they are progressing well and feel ready for discharge. You will see your surgeon prior to your hospital release so that he can examine you and discuss your discharge needs. Please call the [facility] with any questions you may have [phone]. Skin Prep    Home Instruction for Preoperative Antibacterial Dial Soap    Reason for using this soap before surgery:    - To decrease the potential for wound infection after surgery    How to use:    - Obtain Antibacterial Dial soap, either in bar or liquid form from your local store. The soap must be the Antibacterial type of Dial.    Unless you are allergic to this product or notice that it is causing skin irritation, wash with this soap from now until surgery. Make sure to shower with this soap the morning of your surgery before departing for the hospital.    Use a clean wash cloth or new body sponge. Wash from the neck down, paying particular attention to the abdominal area and belly button. Be gentle. DO NOT irritate or scrub the skin until red. Rinse thoroughly and pat dry with a clean towel. Dress with clean clothing.     Do not apply lotions or powders the morning of surgery.

## 2023-02-16 RX ORDER — SODIUM CHLORIDE, SODIUM LACTATE, POTASSIUM CHLORIDE, CALCIUM CHLORIDE 600; 310; 30; 20 MG/100ML; MG/100ML; MG/100ML; MG/100ML
INJECTION, SOLUTION INTRAVENOUS CONTINUOUS
OUTPATIENT
Start: 2023-02-16

## 2023-02-17 ENCOUNTER — HOSPITAL ENCOUNTER (OUTPATIENT)
Dept: PREADMISSION TESTING | Age: 43
Discharge: HOME | End: 2023-02-17
Payer: COMMERCIAL

## 2023-02-17 VITALS
DIASTOLIC BLOOD PRESSURE: 98 MMHG | WEIGHT: 293 LBS | BODY MASS INDEX: 64.88 KG/M2 | HEART RATE: 80 BPM | TEMPERATURE: 97.9 F | OXYGEN SATURATION: 100 % | SYSTOLIC BLOOD PRESSURE: 150 MMHG | RESPIRATION RATE: 20 BRPM

## 2023-02-17 DIAGNOSIS — Z01.818 PRE-OP TESTING: Primary | ICD-10-CM

## 2023-02-17 LAB
ALBUMIN SERPL-MCNC: 4.2 G/DL (ref 3.5–5.2)
ALP BLD-CCNC: 80 U/L (ref 35–104)
ALT SERPL-CCNC: 14 U/L (ref 0–32)
ANION GAP SERPL CALCULATED.3IONS-SCNC: 8 MMOL/L (ref 7–16)
AST SERPL-CCNC: 12 U/L (ref 0–31)
BILIRUB SERPL-MCNC: 0.3 MG/DL (ref 0–1.2)
BUN BLDV-MCNC: 15 MG/DL (ref 6–20)
CALCIUM SERPL-MCNC: 9.1 MG/DL (ref 8.6–10.2)
CHLORIDE BLD-SCNC: 105 MMOL/L (ref 98–107)
CO2: 27 MMOL/L (ref 22–29)
CREAT SERPL-MCNC: 0.6 MG/DL (ref 0.5–1)
EKG ATRIAL RATE: 80 BPM
EKG P AXIS: 44 DEGREES
EKG P-R INTERVAL: 146 MS
EKG Q-T INTERVAL: 382 MS
EKG QRS DURATION: 72 MS
EKG QTC CALCULATION (BAZETT): 440 MS
EKG R AXIS: 45 DEGREES
EKG T AXIS: 36 DEGREES
EKG VENTRICULAR RATE: 80 BPM
GFR SERPL CREATININE-BSD FRML MDRD: >60 ML/MIN/1.73
GLUCOSE BLD-MCNC: 91 MG/DL (ref 74–99)
HCT VFR BLD CALC: 44.2 % (ref 34–48)
HEMOGLOBIN: 14.2 G/DL (ref 11.5–15.5)
MCH RBC QN AUTO: 31.4 PG (ref 26–35)
MCHC RBC AUTO-ENTMCNC: 32.1 % (ref 32–34.5)
MCV RBC AUTO: 97.8 FL (ref 80–99.9)
PDW BLD-RTO: 13.7 FL (ref 11.5–15)
PLATELET # BLD: 266 E9/L (ref 130–450)
PMV BLD AUTO: 10.4 FL (ref 7–12)
POTASSIUM REFLEX MAGNESIUM: 4.3 MMOL/L (ref 3.5–5)
RBC # BLD: 4.52 E12/L (ref 3.5–5.5)
SODIUM BLD-SCNC: 140 MMOL/L (ref 132–146)
TOTAL PROTEIN: 7.1 G/DL (ref 6.4–8.3)
WBC # BLD: 8.7 E9/L (ref 4.5–11.5)

## 2023-02-17 PROCEDURE — 80053 COMPREHEN METABOLIC PANEL: CPT

## 2023-02-17 PROCEDURE — 85027 COMPLETE CBC AUTOMATED: CPT

## 2023-02-17 PROCEDURE — 93005 ELECTROCARDIOGRAM TRACING: CPT | Performed by: ANESTHESIOLOGY

## 2023-02-17 PROCEDURE — 36415 COLL VENOUS BLD VENIPUNCTURE: CPT

## 2023-02-17 ASSESSMENT — PAIN DESCRIPTION - DESCRIPTORS: DESCRIPTORS: SORE

## 2023-02-17 ASSESSMENT — PAIN DESCRIPTION - ONSET: ONSET: ON-GOING

## 2023-02-17 ASSESSMENT — PAIN SCALES - GENERAL: PAINLEVEL_OUTOF10: 6

## 2023-02-17 ASSESSMENT — PAIN DESCRIPTION - LOCATION: LOCATION: GENERALIZED

## 2023-02-17 ASSESSMENT — PAIN DESCRIPTION - PAIN TYPE: TYPE: CHRONIC PAIN

## 2023-02-17 NOTE — PROGRESS NOTES
[address] [facility]                                                                                                                    PRE OP INSTRUCTIONS FOR  Shannon Escort        Date: 2/17/2023    Date of surgery: 2/21/23   Arrival Time: Hospital will call you between 5pm and 7pm Monday evening with your final arrival time for surgery    Do not eat or drink anything after midnight prior to surgery. Drink (2) 24 ounce bottles of gatorade the night before surgery. This includes no water, chewing gum, mints or ice chips. Take the following medications with a small sip of water on the morning of Surgery: Ativan if needed and take Omeprazole     Diabetics may take evening dose of insulin but none after midnight. If you feel symptomatic or low blood sugar morning of surgery drink 1-2 ounces of apple juice only. Aspirin, Ibuprofen, Advil, Naproxen, Vitamin E and other Anti-inflammatory products should be stopped  before surgery  as directed by your physician. Take Tylenol only unless instructed otherwise by your surgeon. Check with your Doctor regarding stopping Plavix, Coumadin, Lovenox, Eliquis, Effient, or other blood thinners. Do not smoke,use illicit drugs and do not drink any alcoholic beverages 24 hours prior to surgery. You may brush your teeth the morning of surgery. DO NOT SWALLOW WATER    You MUST make arrangements for a responsible adult to take you home after your surgery. You will not be allowed to leave alone or drive yourself home. It is strongly suggested someone stay with you the first 24 hrs. Your surgery will be cancelled if you do not have a ride home. PEDIATRIC PATIENTS ONLY:  A parent/legal guardian must accompany a child scheduled for surgery and plan to stay at the hospital until the child is discharged. Please do not bring other children with you.     Please wear simple, loose fitting clothing to the hospital.  Do not bring valuables (money, credit cards, checkbooks, etc.) Do not wear any makeup (including no eye makeup) or nail polish on your fingers or toes. DO NOT wear any jewelry or piercings on day of surgery. All body piercing jewelry must be removed. Shower the night before surgery with _x__Antibacterial soap /ROSANNE WIPES___x_____    TOTAL JOINT REPLACEMENT/HYSTERECTOMY PATIENTS ONLY---Remember to bring Blood Bank bracelet to the hospital on the day of surgery. If you have a Living Will and Durable Power of  for Healthcare, please bring in a copy. If appropriate bring crutches, inspirex, WALKER, CANE etc... Notify your Surgeon if you develop any illness between now and surgery time, cough, cold, fever, sore throat, nausea, vomiting, etc.  Please notify your surgeon if you experience dizziness, shortness of breath or blurred vision between now & the time of your surgery. If you have ___dentures, they will be removed before going to the OR; we will provide you a container. If you wear ___contact lenses or ___glasses, they will be removed; please bring a case for them. To provide excellent care visitors will be limited to 2 in the room at any given time. Please bring picture ID and insurance card. Sleep apnea patients need to bring CPAP AND SETTINGS to hospital on day of surgery. During flu season no children under the age of 15 are permitted in the hospital for the safety of all patients. Other                   Please call AMBULATORY CARE if you have any further questions.    [address] [facility]     [address]

## 2023-02-20 ENCOUNTER — ANESTHESIA EVENT (OUTPATIENT)
Dept: OPERATING ROOM | Age: 43
DRG: 403 | End: 2023-02-20
Payer: COMMERCIAL

## 2023-02-21 ENCOUNTER — HOSPITAL ENCOUNTER (INPATIENT)
Age: 43
LOS: 1 days | Discharge: HOME | DRG: 403 | End: 2023-02-22
Attending: SURGERY | Admitting: SURGERY
Payer: COMMERCIAL

## 2023-02-21 ENCOUNTER — ANESTHESIA (OUTPATIENT)
Dept: OPERATING ROOM | Age: 43
DRG: 403 | End: 2023-02-21
Payer: COMMERCIAL

## 2023-02-21 DIAGNOSIS — E66.01 MORBID OBESITY (HCC): ICD-10-CM

## 2023-02-21 PROBLEM — Z98.84 S/P GASTRIC BYPASS: Status: ACTIVE | Noted: 2023-02-21

## 2023-02-21 LAB
HCG, URINE, POC: NEGATIVE
Lab: NORMAL
NEGATIVE QC PASS/FAIL: NORMAL
POSITIVE QC PASS/FAIL: NORMAL

## 2023-02-21 PROCEDURE — 6370000000 HC RX 637 (ALT 250 FOR IP): Performed by: SURGERY

## 2023-02-21 PROCEDURE — 2580000003 HC RX 258: Performed by: SURGERY

## 2023-02-21 PROCEDURE — 3700000000 HC ANESTHESIA ATTENDED CARE: Performed by: SURGERY

## 2023-02-21 PROCEDURE — C9113 INJ PANTOPRAZOLE SODIUM, VIA: HCPCS | Performed by: SURGERY

## 2023-02-21 PROCEDURE — S2900 ROBOTIC SURGICAL SYSTEM: HCPCS | Performed by: SURGERY

## 2023-02-21 PROCEDURE — 6360000002 HC RX W HCPCS

## 2023-02-21 PROCEDURE — 3600000009 HC SURGERY ROBOT BASE: Performed by: SURGERY

## 2023-02-21 PROCEDURE — 2709999900 HC NON-CHARGEABLE SUPPLY: Performed by: SURGERY

## 2023-02-21 PROCEDURE — 7100000001 HC PACU RECOVERY - ADDTL 15 MIN: Performed by: SURGERY

## 2023-02-21 PROCEDURE — 2720000010 HC SURG SUPPLY STERILE: Performed by: SURGERY

## 2023-02-21 PROCEDURE — 1200000000 HC SEMI PRIVATE

## 2023-02-21 PROCEDURE — 6360000002 HC RX W HCPCS: Performed by: SURGERY

## 2023-02-21 PROCEDURE — 2500000003 HC RX 250 WO HCPCS: Performed by: SURGERY

## 2023-02-21 PROCEDURE — 0D160ZA BYPASS STOMACH TO JEJUNUM, OPEN APPROACH: ICD-10-PCS | Performed by: SURGERY

## 2023-02-21 PROCEDURE — 7100000000 HC PACU RECOVERY - FIRST 15 MIN: Performed by: SURGERY

## 2023-02-21 PROCEDURE — 3600000019 HC SURGERY ROBOT ADDTL 15MIN: Performed by: SURGERY

## 2023-02-21 PROCEDURE — 8E0W4CZ ROBOTIC ASSISTED PROCEDURE OF TRUNK REGION, PERCUTANEOUS ENDOSCOPIC APPROACH: ICD-10-PCS | Performed by: SURGERY

## 2023-02-21 PROCEDURE — A4216 STERILE WATER/SALINE, 10 ML: HCPCS | Performed by: SURGERY

## 2023-02-21 PROCEDURE — 3700000001 HC ADD 15 MINUTES (ANESTHESIA): Performed by: SURGERY

## 2023-02-21 PROCEDURE — 2500000003 HC RX 250 WO HCPCS

## 2023-02-21 RX ORDER — HYDRALAZINE HYDROCHLORIDE 20 MG/ML
10 INJECTION INTRAMUSCULAR; INTRAVENOUS
Status: DISCONTINUED | OUTPATIENT
Start: 2023-02-21 | End: 2023-02-21 | Stop reason: HOSPADM

## 2023-02-21 RX ORDER — SODIUM CHLORIDE 9 MG/ML
INJECTION, SOLUTION INTRAVENOUS PRN
Status: DISCONTINUED | OUTPATIENT
Start: 2023-02-21 | End: 2023-02-21 | Stop reason: HOSPADM

## 2023-02-21 RX ORDER — NEOSTIGMINE METHYLSULFATE 1 MG/ML
INJECTION, SOLUTION INTRAVENOUS PRN
Status: DISCONTINUED | OUTPATIENT
Start: 2023-02-21 | End: 2023-02-21 | Stop reason: SDUPTHER

## 2023-02-21 RX ORDER — BUPIVACAINE HYDROCHLORIDE AND EPINEPHRINE 2.5; 5 MG/ML; UG/ML
INJECTION, SOLUTION EPIDURAL; INFILTRATION; INTRACAUDAL; PERINEURAL PRN
Status: DISCONTINUED | OUTPATIENT
Start: 2023-02-21 | End: 2023-02-21 | Stop reason: ALTCHOICE

## 2023-02-21 RX ORDER — METHOCARBAMOL 100 MG/ML
1000 INJECTION, SOLUTION INTRAMUSCULAR; INTRAVENOUS ONCE
Status: COMPLETED | OUTPATIENT
Start: 2023-02-21 | End: 2023-02-21

## 2023-02-21 RX ORDER — ROCURONIUM BROMIDE 10 MG/ML
INJECTION, SOLUTION INTRAVENOUS PRN
Status: DISCONTINUED | OUTPATIENT
Start: 2023-02-21 | End: 2023-02-21 | Stop reason: SDUPTHER

## 2023-02-21 RX ORDER — ONDANSETRON 2 MG/ML
4 INJECTION INTRAMUSCULAR; INTRAVENOUS EVERY 6 HOURS PRN
Status: DISCONTINUED | OUTPATIENT
Start: 2023-02-21 | End: 2023-02-22 | Stop reason: HOSPADM

## 2023-02-21 RX ORDER — ONDANSETRON 2 MG/ML
4 INJECTION INTRAMUSCULAR; INTRAVENOUS ONCE
Status: COMPLETED | OUTPATIENT
Start: 2023-02-21 | End: 2023-02-21

## 2023-02-21 RX ORDER — POTASSIUM CHLORIDE 7.45 MG/ML
10 INJECTION INTRAVENOUS PRN
Status: DISCONTINUED | OUTPATIENT
Start: 2023-02-21 | End: 2023-02-22 | Stop reason: HOSPADM

## 2023-02-21 RX ORDER — DIPHENHYDRAMINE HYDROCHLORIDE 50 MG/ML
25 INJECTION INTRAMUSCULAR; INTRAVENOUS EVERY 6 HOURS PRN
Status: DISCONTINUED | OUTPATIENT
Start: 2023-02-21 | End: 2023-02-22 | Stop reason: HOSPADM

## 2023-02-21 RX ORDER — MIDAZOLAM HYDROCHLORIDE 1 MG/ML
2 INJECTION INTRAMUSCULAR; INTRAVENOUS
Status: DISCONTINUED | OUTPATIENT
Start: 2023-02-21 | End: 2023-02-21 | Stop reason: HOSPADM

## 2023-02-21 RX ORDER — METHOCARBAMOL 100 MG/ML
INJECTION, SOLUTION INTRAMUSCULAR; INTRAVENOUS
Status: COMPLETED
Start: 2023-02-21 | End: 2023-02-21

## 2023-02-21 RX ORDER — DIPHENHYDRAMINE HYDROCHLORIDE 50 MG/ML
12.5 INJECTION INTRAMUSCULAR; INTRAVENOUS
Status: DISCONTINUED | OUTPATIENT
Start: 2023-02-21 | End: 2023-02-21 | Stop reason: HOSPADM

## 2023-02-21 RX ORDER — MORPHINE SULFATE 2 MG/ML
INJECTION, SOLUTION INTRAMUSCULAR; INTRAVENOUS
Status: COMPLETED
Start: 2023-02-21 | End: 2023-02-21

## 2023-02-21 RX ORDER — ACETAMINOPHEN 160 MG/5ML
650 SUSPENSION, ORAL (FINAL DOSE FORM) ORAL EVERY 6 HOURS
Status: DISCONTINUED | OUTPATIENT
Start: 2023-02-21 | End: 2023-02-22 | Stop reason: HOSPADM

## 2023-02-21 RX ORDER — LIDOCAINE HYDROCHLORIDE 20 MG/ML
INJECTION, SOLUTION INTRAVENOUS PRN
Status: DISCONTINUED | OUTPATIENT
Start: 2023-02-21 | End: 2023-02-21 | Stop reason: SDUPTHER

## 2023-02-21 RX ORDER — DIPHENHYDRAMINE HCL 25 MG
25 TABLET ORAL EVERY 6 HOURS PRN
Status: DISCONTINUED | OUTPATIENT
Start: 2023-02-21 | End: 2023-02-22 | Stop reason: HOSPADM

## 2023-02-21 RX ORDER — SODIUM CHLORIDE 9 MG/ML
INJECTION, SOLUTION INTRAVENOUS PRN
Status: DISCONTINUED | OUTPATIENT
Start: 2023-02-21 | End: 2023-02-22 | Stop reason: HOSPADM

## 2023-02-21 RX ORDER — IPRATROPIUM BROMIDE AND ALBUTEROL SULFATE 2.5; .5 MG/3ML; MG/3ML
1 SOLUTION RESPIRATORY (INHALATION)
Status: DISCONTINUED | OUTPATIENT
Start: 2023-02-21 | End: 2023-02-21 | Stop reason: HOSPADM

## 2023-02-21 RX ORDER — MEPERIDINE HYDROCHLORIDE 25 MG/ML
12.5 INJECTION INTRAMUSCULAR; INTRAVENOUS; SUBCUTANEOUS EVERY 5 MIN PRN
Status: DISCONTINUED | OUTPATIENT
Start: 2023-02-21 | End: 2023-02-21 | Stop reason: HOSPADM

## 2023-02-21 RX ORDER — LABETALOL HYDROCHLORIDE 5 MG/ML
10 INJECTION, SOLUTION INTRAVENOUS
Status: DISCONTINUED | OUTPATIENT
Start: 2023-02-21 | End: 2023-02-21 | Stop reason: HOSPADM

## 2023-02-21 RX ORDER — PROCHLORPERAZINE EDISYLATE 5 MG/ML
5 INJECTION INTRAMUSCULAR; INTRAVENOUS
Status: COMPLETED | OUTPATIENT
Start: 2023-02-21 | End: 2023-02-21

## 2023-02-21 RX ORDER — POTASSIUM CHLORIDE 20 MEQ/1
40 TABLET, EXTENDED RELEASE ORAL PRN
Status: DISCONTINUED | OUTPATIENT
Start: 2023-02-21 | End: 2023-02-22 | Stop reason: HOSPADM

## 2023-02-21 RX ORDER — OXYCODONE HCL 20 MG/ML
5 CONCENTRATE, ORAL ORAL EVERY 4 HOURS PRN
Status: DISCONTINUED | OUTPATIENT
Start: 2023-02-21 | End: 2023-02-22 | Stop reason: HOSPADM

## 2023-02-21 RX ORDER — ONDANSETRON 2 MG/ML
4 INJECTION INTRAMUSCULAR; INTRAVENOUS
Status: COMPLETED | OUTPATIENT
Start: 2023-02-21 | End: 2023-02-21

## 2023-02-21 RX ORDER — DEXAMETHASONE SODIUM PHOSPHATE 10 MG/ML
INJECTION, SOLUTION INTRAMUSCULAR; INTRAVENOUS PRN
Status: DISCONTINUED | OUTPATIENT
Start: 2023-02-21 | End: 2023-02-21 | Stop reason: SDUPTHER

## 2023-02-21 RX ORDER — SODIUM CHLORIDE 9 MG/ML
INJECTION, SOLUTION INTRAVENOUS CONTINUOUS
Status: DISCONTINUED | OUTPATIENT
Start: 2023-02-21 | End: 2023-02-21 | Stop reason: HOSPADM

## 2023-02-21 RX ORDER — ONDANSETRON 2 MG/ML
INJECTION INTRAMUSCULAR; INTRAVENOUS PRN
Status: DISCONTINUED | OUTPATIENT
Start: 2023-02-21 | End: 2023-02-21 | Stop reason: SDUPTHER

## 2023-02-21 RX ORDER — MAGNESIUM SULFATE 1 G/100ML
1000 INJECTION INTRAVENOUS PRN
Status: DISCONTINUED | OUTPATIENT
Start: 2023-02-21 | End: 2023-02-22 | Stop reason: HOSPADM

## 2023-02-21 RX ORDER — SODIUM CHLORIDE 0.9 % (FLUSH) 0.9 %
5-40 SYRINGE (ML) INJECTION PRN
Status: DISCONTINUED | OUTPATIENT
Start: 2023-02-21 | End: 2023-02-22 | Stop reason: HOSPADM

## 2023-02-21 RX ORDER — PROMETHAZINE HYDROCHLORIDE 25 MG/1
25 SUPPOSITORY RECTAL EVERY 6 HOURS PRN
Status: DISCONTINUED | OUTPATIENT
Start: 2023-02-21 | End: 2023-02-22 | Stop reason: HOSPADM

## 2023-02-21 RX ORDER — GLYCOPYRROLATE 0.2 MG/ML
INJECTION INTRAMUSCULAR; INTRAVENOUS PRN
Status: DISCONTINUED | OUTPATIENT
Start: 2023-02-21 | End: 2023-02-21 | Stop reason: SDUPTHER

## 2023-02-21 RX ORDER — FENTANYL CITRATE 50 UG/ML
25 INJECTION, SOLUTION INTRAMUSCULAR; INTRAVENOUS EVERY 5 MIN PRN
Status: DISCONTINUED | OUTPATIENT
Start: 2023-02-21 | End: 2023-02-21 | Stop reason: HOSPADM

## 2023-02-21 RX ORDER — KETOROLAC TROMETHAMINE 30 MG/ML
30 INJECTION, SOLUTION INTRAMUSCULAR; INTRAVENOUS EVERY 6 HOURS
Status: DISCONTINUED | OUTPATIENT
Start: 2023-02-21 | End: 2023-02-22 | Stop reason: HOSPADM

## 2023-02-21 RX ORDER — MIDAZOLAM HYDROCHLORIDE 1 MG/ML
INJECTION INTRAMUSCULAR; INTRAVENOUS PRN
Status: DISCONTINUED | OUTPATIENT
Start: 2023-02-21 | End: 2023-02-21 | Stop reason: SDUPTHER

## 2023-02-21 RX ORDER — FENTANYL CITRATE 50 UG/ML
INJECTION, SOLUTION INTRAMUSCULAR; INTRAVENOUS PRN
Status: DISCONTINUED | OUTPATIENT
Start: 2023-02-21 | End: 2023-02-21 | Stop reason: SDUPTHER

## 2023-02-21 RX ORDER — KETOROLAC TROMETHAMINE 30 MG/ML
INJECTION, SOLUTION INTRAMUSCULAR; INTRAVENOUS
Status: COMPLETED
Start: 2023-02-21 | End: 2023-02-21

## 2023-02-21 RX ORDER — SODIUM CHLORIDE 0.9 % (FLUSH) 0.9 %
5-40 SYRINGE (ML) INJECTION PRN
Status: DISCONTINUED | OUTPATIENT
Start: 2023-02-21 | End: 2023-02-21 | Stop reason: HOSPADM

## 2023-02-21 RX ORDER — SODIUM CHLORIDE 0.9 % (FLUSH) 0.9 %
5-40 SYRINGE (ML) INJECTION EVERY 12 HOURS SCHEDULED
Status: DISCONTINUED | OUTPATIENT
Start: 2023-02-21 | End: 2023-02-21 | Stop reason: HOSPADM

## 2023-02-21 RX ORDER — MORPHINE SULFATE 2 MG/ML
2 INJECTION, SOLUTION INTRAMUSCULAR; INTRAVENOUS
Status: DISCONTINUED | OUTPATIENT
Start: 2023-02-21 | End: 2023-02-22 | Stop reason: HOSPADM

## 2023-02-21 RX ORDER — SCOLOPAMINE TRANSDERMAL SYSTEM 1 MG/1
1 PATCH, EXTENDED RELEASE TRANSDERMAL
Status: DISCONTINUED | OUTPATIENT
Start: 2023-02-21 | End: 2023-02-21

## 2023-02-21 RX ORDER — SCOLOPAMINE TRANSDERMAL SYSTEM 1 MG/1
1 PATCH, EXTENDED RELEASE TRANSDERMAL
Status: DISCONTINUED | OUTPATIENT
Start: 2023-02-21 | End: 2023-02-22 | Stop reason: HOSPADM

## 2023-02-21 RX ORDER — PROCHLORPERAZINE EDISYLATE 5 MG/ML
INJECTION INTRAMUSCULAR; INTRAVENOUS
Status: COMPLETED
Start: 2023-02-21 | End: 2023-02-21

## 2023-02-21 RX ORDER — SODIUM CHLORIDE, SODIUM LACTATE, POTASSIUM CHLORIDE, CALCIUM CHLORIDE 600; 310; 30; 20 MG/100ML; MG/100ML; MG/100ML; MG/100ML
INJECTION, SOLUTION INTRAVENOUS CONTINUOUS
Status: DISCONTINUED | OUTPATIENT
Start: 2023-02-21 | End: 2023-02-22 | Stop reason: HOSPADM

## 2023-02-21 RX ORDER — KETOROLAC TROMETHAMINE 30 MG/ML
30 INJECTION, SOLUTION INTRAMUSCULAR; INTRAVENOUS
Status: COMPLETED | OUTPATIENT
Start: 2023-02-21 | End: 2023-02-21

## 2023-02-21 RX ORDER — ACETAMINOPHEN 500 MG
1000 TABLET ORAL ONCE
Status: COMPLETED | OUTPATIENT
Start: 2023-02-21 | End: 2023-02-21

## 2023-02-21 RX ORDER — MORPHINE SULFATE 2 MG/ML
2 INJECTION, SOLUTION INTRAMUSCULAR; INTRAVENOUS EVERY 5 MIN PRN
Status: DISCONTINUED | OUTPATIENT
Start: 2023-02-21 | End: 2023-02-21 | Stop reason: HOSPADM

## 2023-02-21 RX ORDER — PROPOFOL 10 MG/ML
INJECTION, EMULSION INTRAVENOUS PRN
Status: DISCONTINUED | OUTPATIENT
Start: 2023-02-21 | End: 2023-02-21 | Stop reason: SDUPTHER

## 2023-02-21 RX ORDER — PROCHLORPERAZINE EDISYLATE 5 MG/ML
10 INJECTION INTRAMUSCULAR; INTRAVENOUS EVERY 6 HOURS PRN
Status: DISCONTINUED | OUTPATIENT
Start: 2023-02-21 | End: 2023-02-22 | Stop reason: HOSPADM

## 2023-02-21 RX ORDER — ENOXAPARIN SODIUM 100 MG/ML
40 INJECTION SUBCUTANEOUS DAILY
Status: DISCONTINUED | OUTPATIENT
Start: 2023-02-22 | End: 2023-02-22 | Stop reason: HOSPADM

## 2023-02-21 RX ORDER — ONDANSETRON 2 MG/ML
INJECTION INTRAMUSCULAR; INTRAVENOUS
Status: COMPLETED
Start: 2023-02-21 | End: 2023-02-21

## 2023-02-21 RX ORDER — SODIUM CHLORIDE 0.9 % (FLUSH) 0.9 %
5-40 SYRINGE (ML) INJECTION EVERY 12 HOURS SCHEDULED
Status: DISCONTINUED | OUTPATIENT
Start: 2023-02-21 | End: 2023-02-22 | Stop reason: HOSPADM

## 2023-02-21 RX ORDER — SODIUM CHLORIDE, SODIUM LACTATE, POTASSIUM CHLORIDE, CALCIUM CHLORIDE 600; 310; 30; 20 MG/100ML; MG/100ML; MG/100ML; MG/100ML
INJECTION, SOLUTION INTRAVENOUS CONTINUOUS
Status: DISCONTINUED | OUTPATIENT
Start: 2023-02-21 | End: 2023-02-21 | Stop reason: HOSPADM

## 2023-02-21 RX ADMIN — PROCHLORPERAZINE EDISYLATE 5 MG: 5 INJECTION INTRAMUSCULAR; INTRAVENOUS at 10:27

## 2023-02-21 RX ADMIN — MORPHINE SULFATE 2 MG: 2 INJECTION, SOLUTION INTRAMUSCULAR; INTRAVENOUS at 10:23

## 2023-02-21 RX ADMIN — SODIUM CHLORIDE, POTASSIUM CHLORIDE, SODIUM LACTATE AND CALCIUM CHLORIDE: 600; 310; 30; 20 INJECTION, SOLUTION INTRAVENOUS at 13:31

## 2023-02-21 RX ADMIN — SODIUM CHLORIDE 40 MG: 9 INJECTION, SOLUTION INTRAMUSCULAR; INTRAVENOUS; SUBCUTANEOUS at 14:12

## 2023-02-21 RX ADMIN — MORPHINE SULFATE 2 MG: 2 INJECTION, SOLUTION INTRAMUSCULAR; INTRAVENOUS at 10:37

## 2023-02-21 RX ADMIN — KETOROLAC TROMETHAMINE 30 MG: 30 INJECTION, SOLUTION INTRAMUSCULAR; INTRAVENOUS at 20:34

## 2023-02-21 RX ADMIN — SODIUM CHLORIDE, POTASSIUM CHLORIDE, SODIUM LACTATE AND CALCIUM CHLORIDE: 600; 310; 30; 20 INJECTION, SOLUTION INTRAVENOUS at 20:43

## 2023-02-21 RX ADMIN — LIDOCAINE HYDROCHLORIDE 100 MG: 20 INJECTION, SOLUTION INTRAVENOUS at 08:30

## 2023-02-21 RX ADMIN — FENTANYL CITRATE 50 MCG: 50 INJECTION, SOLUTION INTRAMUSCULAR; INTRAVENOUS at 09:51

## 2023-02-21 RX ADMIN — CEFOXITIN 2000 MG: 2 INJECTION, POWDER, FOR SOLUTION INTRAVENOUS at 08:35

## 2023-02-21 RX ADMIN — ACETAMINOPHEN 1000 MG: 500 TABLET ORAL at 06:54

## 2023-02-21 RX ADMIN — GLYCOPYRROLATE 0.6 MG: 0.2 INJECTION INTRAMUSCULAR; INTRAVENOUS at 09:48

## 2023-02-21 RX ADMIN — METHOCARBAMOL 1000 MG: 100 INJECTION, SOLUTION INTRAMUSCULAR; INTRAVENOUS at 10:15

## 2023-02-21 RX ADMIN — ONDANSETRON 4 MG: 2 INJECTION INTRAMUSCULAR; INTRAVENOUS at 06:54

## 2023-02-21 RX ADMIN — FENTANYL CITRATE 150 MCG: 50 INJECTION, SOLUTION INTRAMUSCULAR; INTRAVENOUS at 08:30

## 2023-02-21 RX ADMIN — ROCURONIUM BROMIDE 10 MG: 10 SOLUTION INTRAVENOUS at 08:57

## 2023-02-21 RX ADMIN — SODIUM CHLORIDE: 900 INJECTION, SOLUTION INTRAVENOUS at 09:25

## 2023-02-21 RX ADMIN — MIDAZOLAM 2 MG: 1 INJECTION INTRAMUSCULAR; INTRAVENOUS at 08:22

## 2023-02-21 RX ADMIN — METHOCARBAMOL 1000 MG: 100 INJECTION INTRAMUSCULAR; INTRAVENOUS at 10:15

## 2023-02-21 RX ADMIN — ACETAMINOPHEN 650 MG: 160 SUSPENSION ORAL at 20:46

## 2023-02-21 RX ADMIN — MORPHINE SULFATE 2 MG: 2 INJECTION, SOLUTION INTRAMUSCULAR; INTRAVENOUS at 23:15

## 2023-02-21 RX ADMIN — KETOROLAC TROMETHAMINE 30 MG: 30 INJECTION, SOLUTION INTRAMUSCULAR; INTRAVENOUS at 10:18

## 2023-02-21 RX ADMIN — MORPHINE SULFATE 2 MG: 2 INJECTION, SOLUTION INTRAMUSCULAR; INTRAVENOUS at 18:30

## 2023-02-21 RX ADMIN — ACETAMINOPHEN 650 MG: 160 SUSPENSION ORAL at 14:18

## 2023-02-21 RX ADMIN — KETOROLAC TROMETHAMINE 30 MG: 30 INJECTION, SOLUTION INTRAMUSCULAR; INTRAVENOUS at 14:08

## 2023-02-21 RX ADMIN — ROCURONIUM BROMIDE 50 MG: 10 SOLUTION INTRAVENOUS at 08:31

## 2023-02-21 RX ADMIN — PHENYLEPHRINE HYDROCHLORIDE 200 MCG: 10 INJECTION INTRAVENOUS at 08:46

## 2023-02-21 RX ADMIN — SODIUM CHLORIDE, PRESERVATIVE FREE 10 ML: 5 INJECTION INTRAVENOUS at 20:44

## 2023-02-21 RX ADMIN — PROPOFOL 200 MG: 10 INJECTION, EMULSION INTRAVENOUS at 08:30

## 2023-02-21 RX ADMIN — ONDANSETRON 4 MG: 2 INJECTION INTRAMUSCULAR; INTRAVENOUS at 10:26

## 2023-02-21 RX ADMIN — ONDANSETRON 4 MG: 2 INJECTION INTRAMUSCULAR; INTRAVENOUS at 09:43

## 2023-02-21 RX ADMIN — Medication 3 MG: at 09:48

## 2023-02-21 RX ADMIN — SODIUM CHLORIDE: 900 INJECTION, SOLUTION INTRAVENOUS at 06:51

## 2023-02-21 RX ADMIN — KETOROLAC TROMETHAMINE 30 MG: 30 INJECTION, SOLUTION INTRAMUSCULAR at 10:18

## 2023-02-21 RX ADMIN — DEXAMETHASONE SODIUM PHOSPHATE 10 MG: 10 INJECTION, SOLUTION INTRAMUSCULAR; INTRAVENOUS at 08:40

## 2023-02-21 ASSESSMENT — PAIN SCALES - GENERAL
PAINLEVEL_OUTOF10: 7
PAINLEVEL_OUTOF10: 0
PAINLEVEL_OUTOF10: 10
PAINLEVEL_OUTOF10: 7
PAINLEVEL_OUTOF10: 6
PAINLEVEL_OUTOF10: 10
PAINLEVEL_OUTOF10: 9
PAINLEVEL_OUTOF10: 6
PAINLEVEL_OUTOF10: 7
PAINLEVEL_OUTOF10: 6
PAINLEVEL_OUTOF10: 6

## 2023-02-21 ASSESSMENT — PAIN DESCRIPTION - DESCRIPTORS
DESCRIPTORS: ACHING

## 2023-02-21 ASSESSMENT — PAIN DESCRIPTION - LOCATION
LOCATION: ABDOMEN

## 2023-02-21 ASSESSMENT — PAIN DESCRIPTION - PAIN TYPE
TYPE: SURGICAL PAIN

## 2023-02-21 ASSESSMENT — PAIN DESCRIPTION - ORIENTATION
ORIENTATION: MID;UPPER

## 2023-02-21 ASSESSMENT — PAIN - FUNCTIONAL ASSESSMENT: PAIN_FUNCTIONAL_ASSESSMENT: NONE - DENIES PAIN

## 2023-02-21 ASSESSMENT — LIFESTYLE VARIABLES
HOW MANY STANDARD DRINKS CONTAINING ALCOHOL DO YOU HAVE ON A TYPICAL DAY: PATIENT DOES NOT DRINK
HOW OFTEN DO YOU HAVE A DRINK CONTAINING ALCOHOL: MONTHLY OR LESS

## 2023-02-21 NOTE — DISCHARGE INSTRUCTIONS
Your information:  Name: Moiz Toro  : 1980    Dr. Tisha Kerr MD, MS  Discharge Instructions for Bariatric Surgery  Sd-en-Y gastric bypass OR Sleeve Gastrectomy       HOME CARE   Keep the incision area clean and dry. The glue on the incision is waterproof so you can shower. Do not remove the surgical glue. Leave the incisions open to air. Only cover if drainage occurs. Place ice on painful incision for 1-2 days. Make sure you know how to use and continue to use your incentive spirometer every hour while awake at home. DIET   Drink frequently and always carry fluids with you while awake to maintain hydration. Follow the diet instructions that you received in your Nutrition Education Manual for the dates and times of when to start your Bariatric Clear Liquids and Bariatric Full Liquid Diet along with the protein supplements. DO NOT take the Bariatric Multivitamins, Iron, Calcium, Vitamin D or Vitamin b12 supplements until instructed to do so at your 2 week follow up appointment with your surgeon. Slow down intake of liquids if there is chest pressure, acid or fullness. Drinking too fast or too much at one time can result in pain and vomiting. You may notice increased gas or changes in your bowel habits during the first month after surgery. Keep the bowels soft with stool softeners at first, then switch to Metamucil. If you are not able to drink 64 ounces of fluid a day, you will develop constipation. Keep the bowels moving daily. PHYSICAL ACTIVITY   Walk frequently and keep your legs elevated when sitting to prevent blood clots in the legs. Discomfort at the incisions is normal with increasing activity. Severe pain at the incisions is not expected and you should avoid activities that result in severe pain. Increase your activity gradually, walking at least 10 minutes every hour you are awake. Do not drive while taking narcotic pain medication as this can cause drowsiness.  You must be narcotic free for at least 24 hours before driving. Do not bend, twist, pull, or lift over 20 pounds for the first 2 weeks after surgery. Then for the next 2 weeks after that, do not bend, twist, pull, or lift over 50 pounds. If anything causes pain or discomfort, stop! Breathe deeply every hour to prevent pneumonia. Continue to use your incentive spirometer at home as this will help to prevent pneumonia as well. Medications   Restart blood thinners in 24 hours if no signs of bleeding. Take Tylenol for pain. Take Colace 100 mg twice a day. Take Ondansetron (Zofran), Omeprazole (Prilosec), or Sucralfate (Carafate) as prescribed. Follow up with Primary Care Physician (PCP)regarding home medications you were taking prior to surgery or with the prescribing physician if not your PCP. Extended-release medications are not recommended. Your PCP should convert to another medication if possible. DO NOT STOP ANY PRESCRIBED MEDICATION WITHOUT TALKING TO YOUR DOCTOR. If diabetic, check blood sugars as ordered by PCP or Endocrinologist. Follow up with PCP or Endocrinologist regarding diabetic medications. Make sure you take any medicines you were on for depression or anxiety. FOLLOW-UP   Follow-up appointment with surgeon 10-14 days after surgery. Complete lab work prior to this appointment. Follow-up with PCP and/or Endocrinologist prior to seeing surgeon. CALL [facility] WEIGHT LOSS OFFICE [phone] IF ANY OF THE FOLLOWING OCCURS TO BE SEEN IN THE OFFICE:   Signs of infection, fever, chills, redness, swelling, increasing pain, or discharge at the incision site. Nausea not relieved by medication, frequent vomiting and not able to keep anything down, and  excessive diarrhea (more than 8 episodes in 24 hours). Pain, burning, urgency, or frequency of urination or blood in the urine. Pain and/or swelling in your feet, calves, or legs.    You have pain that does not get better after taking pain medication   You cannot pass stool or gas. You are sick to your stomach and cannot drink fluids. You have loose stitches, or your incision comes open. You have signs of a blood clot such as:  - pain in your calf, back of the knee, thigh, or groin. - redness and swelling in your leg or groin   If you feel dehydrated, call the office to have IV fluids ordered. Signs of dehydration include dizziness, tiredness, dry mouth, dry lips, decreased urine output, dark colored urine, headache, feeling thirsty and muscle cramps. This can occur if you are not meeting your daily fluid intake goal.   Watch closely for changes in your health, and be sure to contact your doctor if you have any problems    FOR ANY OF THE FOLLOWING, GO TO THE ER:   [facility]    IN CASE OF EMERGENCY, CALL 911 IMMEDIATELY  Call 911 anytime you think you may need emergency care. For example, call if:    You passed out (lost consciousness). You are short of breath. Call your doctor now or seek immediate medical care if:    You have pain that does not get better after you take pain medicine. You cannot pass stool or gas. You are sick to your stomach and cannot drink fluids. You have loose stitches, or your incision comes open. You have signs of a blood clot, such as:  Pain in your calf, back of the knee, thigh, or groin. Redness and swelling in your leg or groin. You have signs of infection, such as: Increased pain, swelling, warmth, or redness. Red streaks leading from the incision. Pus draining from the incision. A fever. Watch closely for changes in your health, and be sure to contact your doctor if you have any problems.     The following personal items were collected during your admission and were returned to you:    Belongings  Dental Appliances: None  Vision - Corrective Lenses: None  Hearing Aid: None  Clothing: Socks, Shirt, Pants, Jacket/Coat, Footwear, Undergarments  Jewelry: None  Body Piercings Removed: Yes  Electronic Devices: Cell Phone,   Weapons (Notify Protective Services/Security): None  Other Valuables: At bedside  Home Medications: Kept at bedside  Valuables Given To: Patient  Responsible person(s) in the waiting room: michelle  Patient approves for provider to speak to responsible person post operatively: Yes    Information obtained by:  By signing below, I understand that if any problems occur once I leave the hospital I am to contact Dr. Paz Leung. I understand and acknowledge receipt of the instructions indicated above.

## 2023-02-21 NOTE — H&P
Martha Patrick  2/21/2023  [id number]  [address] [facility]  [facility] [place] [facility]               History and Physical  Gastric Bypass     CHIEF COMPLAINT: Morbid obesity, Hypertension, Obstructive Sleep Apnea, Dyspnea on Exertion, Depression, Extremity Edema, Non-alcoholic Steatohepatitis (SAHU), Polycystic Ovarian Syndrome, and Venous Stasis Disease    HISTORY OF PRESENT ILLNESS: Martha Patrick is a morbidly-obese 36 y.o.  female, who weighs (!) 363 lb (164.7 kg). She is 216 pounds over her ideal body weight. The There is no height or weight on file to calculate BMI. She has lost 12 pounds over the past several months in preparation for surgery. She has multiple medical problems aggravated by her obesity. She wishes to have a gastric bypass so that she can lose more weight and keep the weight off. I have met with her on 2 different occasions in the [facility], where we discussed the surgery in great detail and went over the risks and benefits. She has watched our informational video so she understands all of the extensive risks involved. She states that she understands all of these risks and wishes to proceed. POSTOPERATIVE DIAGNOSES:   (1) No Hiatal Hernia, Effacement Hill Grade II  (2) No Esophagitis  (3) Mild Gastritis with some retained gastric contents  (4) No Duodenitis    Allergies   Allergen Reactions    Food Nausea Only and Other (See Comments)     Lactose Intolerance - All Dairy    Tetracyclines & Related      Teeth turn yellow       No current facility-administered medications on file prior to encounter.      Current Outpatient Medications on File Prior to Encounter   Medication Sig Dispense Refill    omeprazole (PRILOSEC) 40 MG delayed release capsule Take 40 mg by mouth daily      ibuprofen (ADVIL;MOTRIN) 800 MG tablet Take 800 mg by mouth as needed for Pain      acetaminophen (TYLENOL) 500 MG tablet Take 1,000 mg by mouth as needed for Pain      DULoxetine (CYMBALTA) 60 MG extended release capsule Take 60 mg by mouth nightly      losartan (COZAAR) 25 MG tablet Take 25 mg by mouth daily      LORazepam (ATIVAN) 0.5 MG tablet Take 0.5 mg by mouth every 6 hours as needed for Anxiety. Past Medical History:   Diagnosis Date    Anxiety     Benign essential hypertension antepartum 2015    Cancer ([facility]     skin- labia majora, back    Depression     Morbid obesity due to excess calories ([facility]     Obesity        Past Surgical History:   Procedure Laterality Date     SECTION      COLPOSCOPY      DILATION AND CURETTAGE      SKIN BIOPSY      UPPER GASTROINTESTINAL ENDOSCOPY N/A 10/20/2022    EGD BIOPSY performed by August Carter MD at [place] [address] Facility-Administered Medications   Medication Dose Route Frequency Provider Last Rate Last Admin    acetaminophen (TYLENOL) tablet 1,000 mg  1,000 mg Oral Once August Carter MD        0.9 % sodium chloride infusion   IntraVENous Continuous August Carter MD        sodium chloride flush 0.9 % injection 5-40 mL  5-40 mL IntraVENous 2 times per day August Carter MD        sodium chloride flush 0.9 % injection 5-40 mL  5-40 mL IntraVENous PRN August Carter MD        0.9 % sodium chloride infusion   IntraVENous PRN August Carter MD        ondansetron [facility]) injection 4 mg  4 mg IntraVENous Once August Carter MD        scopolamine (TRANSDERM-SCOP) transdermal patch 1 patch  1 patch TransDERmal Q72H August Carter MD        cefOXitin (MEFOXIN) 2,000 mg in sodium chloride 0.9 % 50 mL IVPB ([id number])  2,000 mg IntraVENous On Call to Sonia Zelaya MD        lactated ringers IV soln infusion   IntraVENous Continuous Ishmael Russell MD           Allergies   Allergen Reactions    Food Nausea Only and Other (See Comments)     Lactose Intolerance - All Dairy    Tetracyclines & Related      Teeth turn yellow       No family history on file.     Social History     Socioeconomic History    Marital status: Single     Spouse name: Not on file    Number of children: Not on file    Years of education: Not on file    Highest education level: Not on file   Occupational History    Not on file   Tobacco Use    Smoking status: Never    Smokeless tobacco: Never   Vaping Use    Vaping Use: Never used   Substance and Sexual Activity    Alcohol use: No    Drug use: No    Sexual activity: Not on file   Other Topics Concern    Not on file   Social History Narrative    Not on file     Social Determinants of Health     Financial Resource Strain: Not on file   Food Insecurity: Not on file   Transportation Needs: Not on file   Physical Activity: Not on file   Stress: Not on file   Social Connections: Not on file   Intimate Partner Violence: Not on file   Housing Stability: Not on file       Review of Systems  General ROS: negative for - chills, fatigue or malaise  ENT ROS: negative for - hearing change, nasal congestion or nasal discharge  Allergy and Immunology ROS: negative for - hives, itchy/watery eyes or nasal congestion  Hematological and Lymphatic ROS: negative for - blood clots, blood transfusions, bruising or fatigue  Endocrine ROS: negative for - malaise/lethargy, mood swings, palpitations or polydipsia/polyuria  Respiratory ROS: negative for - sputum changes, stridor, tachypnea or wheezing  Cardiovascular ROS: negative for - irregular heartbeat, loss of consciousness, murmur or orthopnea  Gastrointestinal ROS: negative for - constipation, diarrhea, gas/bloating, heartburn or hematemesis  Genito-Urinary ROS: negative for -  genital discharge, genital ulcers or hematuria  Musculoskeletal ROS: negative for - gait disturbance, muscle pain or muscular weakness  Psychiatric ROS: negative for - visual or auditory hallucinations, suicidal ideation      Physical Exam:   Vitals: LMP 02/15/2023 (Exact Date)     General appearance: AAO, NAD  Eyes: PERRL, EOMI, red conjunctiva  Lungs: CTAB, no wheeze, no rhonchi  Chest wall: atraumatic, no tenderness, no echymosis or abrasions  Heart: reg rate, no murmur  Abdomen: soft, nondistended, nontender  Extremities: full ROM all 4 ext, no gross motor or sensory deficits  Pulses: 2+ distal  Skin: warm and dry  Neurologic: spontanous eye opening, purposeful, follows complex commands  Psych: No hallucinations      Assessment:  Morbid obesity with failure of conservative therapy. Patient has been cleared psychologically and medically. The gall bladder ultrasound was evident of fatty live. Upper endoscopy showed no hiatal hernia. The patient was informed that risks include, but are not limited to: death, anastomotic leak, obstruction, bleeding, and sepsis. Any of these could require further surgery. Other risks include heart attack, DVT, PE, pneumonia, hernia, wound infection, the need for dilatations of the gastrojejunostomy, and the inability to lose appropriate weight and keep it off. We may not be able to do a Gastic Bypass, in that case we will do a Sleeve Gastrectomy. We discussed that our goal is to ameliorate the medical problems and not to obtain a specific body mass index. She understands the risks and benefits and wishes to proceed with the procedure. She has signed a consent form. Plan:  ([id number]) Laparoscopic Sd-en-Y Gastric Bypass possible hiatal hernia repair. She does not need Lovenox post-op.     Physician Signature: Electronically signed by Dr. Betina Funk MD      Send copy of H&P to PCP, Killian Bolanos MD

## 2023-02-21 NOTE — ANESTHESIA POSTPROCEDURE EVALUATION
Department of Anesthesiology  Postprocedure Note    Patient: Sully Flores  MRN: [id number]  YOB: 1980  Date of evaluation: 2/21/2023      Procedure Summary     Date: 02/21/23 Room / Location: [address] [place] [address] 06 / [address]    Anesthesia Start: 3155 Anesthesia Stop: 1008    Procedure: GASTRIC BYPASS LI-EN-Y LAPAROSCOPIC ROBOTIC XI Diagnosis:       Morbid obesity ([address]      (Morbid obesity ([address] [E66.01])    Surgeons: Chloe Webster MD Responsible Provider: Melissa Booker MD    Anesthesia Type: General ASA Status: 3          Anesthesia Type: General    Katelyn Phase I: Katelyn Score: 8    Katelyn Phase II:        Anesthesia Post Evaluation    Patient location during evaluation: PACU  Patient participation: complete - patient participated  Level of consciousness: awake  Airway patency: patent  Nausea & Vomiting: no nausea and no vomiting  Complications: no  Cardiovascular status: hemodynamically stable  Respiratory status: acceptable  Hydration status: euvolemic

## 2023-02-21 NOTE — OP NOTE
[address] [facility]                [facility]    Operative Report  Abbe Evans MD, MS, FACS, [facility]    DATE OF PROCEDURE: 2/21/2023    SURGEON: Dr. Taran Thomas MD, [facility]    ASSISTANT: Bharat Rich MD;First Guillermina Mejias    PREOPERATIVE DIAGNOSES:   Patient Active Problem List   Diagnosis    High-risk pregnancy    Passive smoker    Morbid obesity with BMI of 50.0-59.9, adult ([address]    AMA (advanced maternal age) multigravida 35+    Maternal morbid obesity, antepartum ([address]    Elderly multigravida with antepartum condition or complication    Suspected damage to fetus from drugs, affecting management of mother, antepartum    Maternal morbid obesity, antepartum ([address]    Benign essential hypertension, antepartum    HTN complicating peripregnancy, antepartum    Proteinuria complicating pregnancy in third trimester    S/P gastric bypass       Morbid obesity     POSTOPERATIVE DIAGNOSES:   Same      OPERATION:   1) Laparoscopic Robot assisted Sd-en-Y gastric bypass   2) Upper endoscopy   3) Omental flap          ANESTHESIA: General endotracheal.     ESTIMATED BLOOD LOSS: 10 mL. COMPLICATIONS: None. HISTORY: Axel Baker is a morbidly-obese 36 y.o.  female, who weighs 366 pounds. The BMI 64.88kg/m2. She has multiple medical problems aggravated by her obesity and a hiatal hernia causing reflux. She wishes to have a gastric bypass so that she can lose more weight and keep the weight off and potentially have the hiatal hernia repaired to help decrease the reflux problems. She understands the extensive risks involved in the surgery and wishes to proceed. PROCEDURE: The patient was placed on the table in the supine position and placed under general endotracheal anesthesia. They had SCDs on the legs as DVT prophylaxis. They had Ancef IV. Orogastric tube placed in the stomach, then removed.   The abdomen was clipped, then prepped with DuraPrep and draped in the usual sterile fashion. Then, 0.25% Marcaine plus epinephrine was injected into the skin and muscle of each incision to help with postoperative pain control. An 8mm incision was made above the umbilicus and veress needle inserted and confirmed its placement. The abdomen was insufflated to 15mmHg, the needle removed and 8mm trocar inserted. The camera was inserted and the abdomen inspected. A 12 mm trocar was placed in the right mid abdomen, an 8-mm trocar in the left midabdomen, 8-mm trocar in the right lateral subcostal region. The head of the bed was elevated. UrtheCast liver retractor was placed below the xiphoid. The liver was large and fatty. The liver was retracted methodically. The robot was then docked over the left side. Pars flaccida clear area was opened with synchroseal. The hiatus was inspected and no hiatal hernia was appreciated. The lesser omental vessels were taken down to the lesser curvature of the stomach. The Robotic 60mm blue cartridge was fired horizontally across the stomach to start the pouch. Three more firings vertically out through an opening at the angle of HIS. Pouch size approximately 30 mL. The ligament of Treitz was identified and measured 80 cm and brought up to the stomach pouch in omega loop fashion. The jejunum was sewn to the stomach with a V lock absorbable suture along the back wall of the anastomosis. Hook cautery was used to make a small hole in the anterior wall at the distal gastric staple line and a enterotomy was made in the small bowel opposite to the gastrostomy also with hook cautery. We then insterted a 39 F bougie was placed by Anaesthesia through the anastomosis after the stomach and jejunum were opened with hook catery. The anastamosis was then completed with a second absorbable barbed suture.  Vessel sealer was used to make a small opening in the mesentery of the biliary limb and it was then divided with the Robotic white cartridge lateral to the gastric anastomosis. The Bougie was removed. Hook cautery was used to make an enterotomy 5cm from the staple line in the biliary limb. The small bowel was then run 150 cm from the gastrojejunal anastomosis and a loop of jejunum was brought up for the distal anastomosis using the triple-staple technique. Hook cautery was used to make a small opening in common limb. The Robotic 60 white cartridge was fired, inside the lumen in opposite directions. The enterotomy was closed transversely with a Robotic 60 blue cartridge, finishing the distal anastomosis. The mesenteric defect was closed with a running absorbable barbed suture to prevent internal hernias. The abdomen was filled with saline. A bowel clamp was placed on the jejunum distal to the gastrojejunal anastomosis. An Endoscope was passed down through the mouth. The scope fit past the hiatus without problems and again no hiatal hernia was appreciated. The scope was pushed into the pouch. Old blood was removed with suction. The pouch was inflated with air. There was no bubbling from any of the staple lines indicating they were airtight and watertight. The anastomosis was open approximately 9 mm. The scope was easily passed through the anastomosis into the jejunum. All of the mucosa looked healthy. The scope was withdrawn. Omentum was mobilized from the greater curvature and the transverse colon to reach the gastrojejunal anastomosis. The anastomosis was wrapped with omentum and tacked with a v lock suture. The omentum was mobilized to preserve blood supply and wrapped 360 degrees around the anastomosis to buttress the closure. All of the fluid in the abdomen was removed with suction. All of the CO2 was released. The trocars were removed. Skin wounds were closed with 4-0 Monocryl dermal stitches and Derma+flex glue was applied. The needle and sponge count were correct. The patient tolerated the procedure well and went to recovery in stable condition. Rima Rogers MD

## 2023-02-21 NOTE — CONSULTS
Department of Internal Medicine  Internal Medicine Consultation Note    Primary Care Physician: Merle Gomez MD   Admitting Physician:  Rima Rogers MD  Admission date and time: 2023  6:20 AM    Room:  OR POOL/NONE  Admitting diagnosis: Morbid obesity ([facility]) [E66.01]  S/P gastric bypass [Z98.84]    Patient Name: Tari Dutta  MRN: [id number]    Date of Service: 2023     Reason for consultation: Medical management    HISTORY OF PRESENT ILLNESS:    Tari Dutta is a 69-year-old female patient presented to [address] [place] [address] for planned bariatric surgery today. She was at her baseline state of health prior to the procedure. She has gone for appropriate clearance as per the surgeon. We have reviewed her medical and surgical history at length at bedside. Denies any associated fever or chills. Denies chest pains, shortness of breath, cough with phlegm or hemoptysis. Denies abdominal pains, nausea, vomiting, change in bowel patterns, hematochezia or melena. Denies dysuria, hematuria, suprapubic or flank pains. PAST MEDICAL Hx:  Past Medical History:   Diagnosis Date    Anxiety     Benign essential hypertension antepartum 2015    Cancer ([facility])     skin- labia majora, back    Depression     Morbid obesity due to excess calories ([facility])     Obesity        PAST SURGICAL Hx:   Past Surgical History:   Procedure Laterality Date     SECTION      COLPOSCOPY      DILATION AND CURETTAGE      SKIN BIOPSY      UPPER GASTROINTESTINAL ENDOSCOPY N/A 10/20/2022    EGD BIOPSY performed by Rima Rogers MD at [address] [facility] Hx:  No family history on file. HOME MEDICATIONS:  Prior to Admission medications    Medication Sig Start Date End Date Taking?  Authorizing Provider   ondansetron (ZOFRAN-ODT) 4 MG disintegrating tablet Take 1 tablet by mouth every 8 hours as needed for Nausea or Vomiting 2/10/23   Rima Rogers MD   omeprazole (PRILOSEC) 40 MG delayed release capsule Take 40 mg by mouth daily    Historical Provider, MD   ibuprofen (ADVIL;MOTRIN) 800 MG tablet Take 800 mg by mouth as needed for Pain    Historical Provider, MD   acetaminophen (TYLENOL) 500 MG tablet Take 1,000 mg by mouth as needed for Pain    Historical Provider, MD   DULoxetine (CYMBALTA) 60 MG extended release capsule Take 60 mg by mouth nightly    Historical Provider, MD   losartan (COZAAR) 25 MG tablet Take 25 mg by mouth daily    Historical Provider, MD   LORazepam (ATIVAN) 0.5 MG tablet Take 0.5 mg by mouth every 6 hours as needed for Anxiety. Historical Provider, MD       ALLERGIES:  Food and Tetracyclines & related    SOCIAL Hx:  Social History     Socioeconomic History    Marital status: Single     Spouse name: Not on file    Number of children: Not on file    Years of education: Not on file    Highest education level: Not on file   Occupational History    Not on file   Tobacco Use    Smoking status: Never    Smokeless tobacco: Never   Vaping Use    Vaping Use: Never used   Substance and Sexual Activity    Alcohol use: No    Drug use: No    Sexual activity: Not on file   Other Topics Concern    Not on file   Social History Narrative    Not on file     Social Determinants of Health     Financial Resource Strain: Not on file   Food Insecurity: Not on file   Transportation Needs: Not on file   Physical Activity: Not on file   Stress: Not on file   Social Connections: Not on file   Intimate Partner Violence: Not on file   Housing Stability: Not on file     ROS: 12 point review of symptoms was conducted, pertinent positives and negative were reviewed, aside from that all 12 systems were reviewed and negative.      PHYSICAL EXAM:  VITALS:  Vitals:    02/21/23 0642   BP: 129/65   Pulse: 91   Resp: (!) 161   Temp: 98.5 °F (36.9 °C)   SpO2: 98%         CONSTITUTIONAL:    Awake, alert, cooperative, comfortable appearing, no apparent distress    EYES:    PERRL, EOMI, sclera clear without icterus, conjunctiva normal    ENT: Normocephalic, atraumatic, sinuses nontender on palpation. External ears without lesions. Oral pharynx with moist mucus membranes. NECK:    Supple, symmetrical, trachea midline, no adenopathy, thyroid symmetric, not enlarged and no tenderness, skin normal, no bruits, no JVD    HEMATOLOGIC/LYMPHATICS:    No cervical lymphadenopathy and no supraclavicular lymphadenopathy    LUNGS:    Symmetric. No increased work of breathing, normal air exchange, clear to auscultation bilaterally, no wheezes, rhonchi, or rales,     CARDIOVASCULAR:    Normal apical impulse, regular rate and rhythm, normal S1 and S2, no murmur noted    ABDOMEN:    Obese contour, normal bowel sounds, soft, non-distended, non-tender, no rebound or guarding elicited on palpation     MUSCULOSKELETAL:    There is no redness, warmth, or swelling of the joints. Tone is normal.    NEUROLOGIC:    Awake, alert, oriented to name, place and time. Cranial nerves II-XII are grossly intact. Motor is 5 out of 5 bilaterally. SKIN:    No bruising or bleeding. No redness, warmth, or swelling    EXTREMITIES:    Peripheral pulses present. No edema, cyanosis, or swelling.     LABORATORY DATA:  CBC with Differential:    Lab Results   Component Value Date/Time    WBC 8.7 02/17/2023 09:15 AM    RBC 4.52 02/17/2023 09:15 AM    HGB 14.2 02/17/2023 09:15 AM    HCT 44.2 02/17/2023 09:15 AM     02/17/2023 09:15 AM    MCV 97.8 02/17/2023 09:15 AM    MCH 31.4 02/17/2023 09:15 AM    MCHC 32.1 02/17/2023 09:15 AM    RDW 13.7 02/17/2023 09:15 AM    LYMPHOPCT 31.2 10/14/2022 08:30 AM    MONOPCT 7.9 10/14/2022 08:30 AM    BASOPCT 0.6 10/14/2022 08:30 AM    MONOSABS 0.67 10/14/2022 08:30 AM    LYMPHSABS 2.63 10/14/2022 08:30 AM    EOSABS 0.11 10/14/2022 08:30 AM    BASOSABS 0.05 10/14/2022 08:30 AM     BMP:    Lab Results   Component Value Date/Time     02/17/2023 09:15 AM    K 4.3 02/17/2023 09:15 AM     02/17/2023 09:15 AM    CO2 27 02/17/2023 09:15 AM BUN 15 02/17/2023 09:15 AM    LABALBU 4.2 02/17/2023 09:15 AM    CREATININE 0.6 02/17/2023 09:15 AM    CALCIUM 9.1 02/17/2023 09:15 AM    GFRAA >60 10/14/2022 08:30 AM    LABGLOM >60 02/17/2023 09:15 AM    GLUCOSE 91 02/17/2023 09:15 AM       ASSESSMENT:  Super morbid obesity with BMI 64.88 kg per metered squared with plan for Sd-en-Y gastric bypass February 21, 2023 by Dr. Virgie Carroll hypertension  Hyperlipidemia  Obstructive sleep apnea  Nonalcoholic steatohepatitis  Polycystic ovarian syndrome    PLAN:  Felisha You 23-year-old female patient who presented for planned bariatric surgery. She has undergone appropriate clearance as per the requirements of the bariatric surgeon. Postoperative symptomatic and supportive care, activity and dietary advancement as per the surgery team. Routine lab will be assessed including A1c, lipid panel and thyroid studies will be addressed accordingly. Underlying co-morbidites will be addressed during hospitalization as well. Labs and vital signs will be monitored closely and addressed accordingly. See additional orders for details.      SANDRA Whitman CNP  7:36 AM  2/21/2023    Electronically signed by SANDRA Whitman CNP on 2/21/23 at 7:36 AM EST;

## 2023-02-21 NOTE — ANESTHESIA PRE PROCEDURE
Department of Anesthesiology  Preprocedure Note       Name:  Marvin Klein   Age:  36 y.o.  :  1980                                          MRN:  [id number]         Date:  2023      Surgeon: Chon Farley):  Bernabe Simpson MD    Procedure: Procedure(s):  GASTRIC BYPASS LI-EN-Y LAPAROSCOPIC ROBOTIC XI    Medications prior to admission:   Prior to Admission medications    Medication Sig Start Date End Date Taking? Authorizing Provider   ondansetron (ZOFRAN-ODT) 4 MG disintegrating tablet Take 1 tablet by mouth every 8 hours as needed for Nausea or Vomiting 2/10/23   Bernabe Simpson MD   omeprazole (PRILOSEC) 40 MG delayed release capsule Take 40 mg by mouth daily    Historical Provider, MD   ibuprofen (ADVIL;MOTRIN) 800 MG tablet Take 800 mg by mouth as needed for Pain    Historical Provider, MD   acetaminophen (TYLENOL) 500 MG tablet Take 1,000 mg by mouth as needed for Pain    Historical Provider, MD   DULoxetine (CYMBALTA) 60 MG extended release capsule Take 60 mg by mouth nightly    Historical Provider, MD   losartan (COZAAR) 25 MG tablet Take 25 mg by mouth daily    Historical Provider, MD   LORazepam (ATIVAN) 0.5 MG tablet Take 0.5 mg by mouth every 6 hours as needed for Anxiety. Historical Provider, MD       Current medications:    No current facility-administered medications for this visit. No current outpatient medications on file.      Facility-Administered Medications Ordered in Other Visits   Medication Dose Route Frequency Provider Last Rate Last Admin    0.9 % sodium chloride infusion   IntraVENous Continuous Bernabe Simpson  mL/hr at 23 0651 New Bag at 23 0651    sodium chloride flush 0.9 % injection 5-40 mL  5-40 mL IntraVENous 2 times per day Bernabe Simpson MD        sodium chloride flush 0.9 % injection 5-40 mL  5-40 mL IntraVENous PRN Bernabe Simpson MD        0.9 % sodium chloride infusion   IntraVENous PRN Bernabe Simpson MD        scopolamine (TRANSDERM-SCOP) transdermal patch 1 patch  1 patch TransDERmal Q72H Barrett Hess MD   1 patch at 23 0654    cefOXitin (MEFOXIN) 2,000 mg in sodium chloride 0.9 % 50 mL IVPB ([id number])  2,000 mg IntraVENous On Call to Naresh Chamorro MD        lactated ringers IV soln infusion   IntraVENous Continuous Sukumar Prado MD           Allergies: Allergies   Allergen Reactions    Food Nausea Only and Other (See Comments)     Lactose Intolerance - All Dairy    Tetracyclines & Related      Teeth turn yellow       Problem List:    Patient Active Problem List   Diagnosis Code    High-risk pregnancy O09.90    Passive smoker Z77.22    Morbid obesity with BMI of 50.0-59.9, adult ([facility] E66.01, Z68.43    AMA (advanced maternal age) multigravida 33+ O12.46    Maternal morbid obesity, antepartum ([facility] O99.210, E77.80    Elderly multigravida with antepartum condition or complication M77.716    Suspected damage to fetus from drugs, affecting management of mother, antepartum O32. 5XX0    Maternal morbid obesity, antepartum ([facility] O99.210, E66.01    Benign essential hypertension, antepartum P97.502    HTN complicating peripregnancy, antepartum O16.9    Proteinuria complicating pregnancy in third trimester O12.13    S/P gastric bypass Z98.84       Past Medical History:        Diagnosis Date    Anxiety     Benign essential hypertension antepartum 2015    Cancer ([facility]     skin- labia majora, back    Depression     Morbid obesity due to excess calories ([facility]     Obesity        Past Surgical History:        Procedure Laterality Date     SECTION      COLPOSCOPY      DILATION AND CURETTAGE      SKIN BIOPSY      UPPER GASTROINTESTINAL ENDOSCOPY N/A 10/20/2022    EGD BIOPSY performed by Barrett Hess MD at [address] History:    Social History     Tobacco Use    Smoking status: Never    Smokeless tobacco: Never   Substance Use Topics    Alcohol use:  No Counseling given: Not Answered      Vital Signs (Current): There were no vitals filed for this visit. BP Readings from Last 3 Encounters:   02/21/23 129/65   02/17/23 (!) 150/98   02/10/23 (!) 159/99       NPO Status:                                                                                 BMI:   Wt Readings from Last 3 Encounters:   02/17/23 (!) 366 lb 4 oz (166.1 kg)   02/10/23 (!) 363 lb (164.7 kg)   12/21/22 (!) 375 lb (170.1 kg)     There is no height or weight on file to calculate BMI.    CBC:   Lab Results   Component Value Date/Time    WBC 8.7 02/17/2023 09:15 AM    RBC 4.52 02/17/2023 09:15 AM    HGB 14.2 02/17/2023 09:15 AM    HCT 44.2 02/17/2023 09:15 AM    MCV 97.8 02/17/2023 09:15 AM    RDW 13.7 02/17/2023 09:15 AM     02/17/2023 09:15 AM       CMP:   Lab Results   Component Value Date/Time     02/17/2023 09:15 AM    K 4.3 02/17/2023 09:15 AM     02/17/2023 09:15 AM    CO2 27 02/17/2023 09:15 AM    BUN 15 02/17/2023 09:15 AM    CREATININE 0.6 02/17/2023 09:15 AM    GFRAA >60 10/14/2022 08:30 AM    LABGLOM >60 02/17/2023 09:15 AM    GLUCOSE 91 02/17/2023 09:15 AM    PROT 7.1 02/17/2023 09:15 AM    CALCIUM 9.1 02/17/2023 09:15 AM    BILITOT 0.3 02/17/2023 09:15 AM    ALKPHOS 80 02/17/2023 09:15 AM    AST 12 02/17/2023 09:15 AM    ALT 14 02/17/2023 09:15 AM       POC Tests: No results for input(s): POCGLU, POCNA, POCK, POCCL, POCBUN, POCHEMO, POCHCT in the last 72 hours. Coags:   Lab Results   Component Value Date/Time    PROTIME 9.9 04/16/2015 05:00 PM    INR 0.9 04/16/2015 05:00 PM    APTT 24.6 04/16/2015 05:00 PM       HCG (If Applicable):   Lab Results   Component Value Date    PREGTESTUR negative 06/28/2016        ABGs: No results found for: PHART, PO2ART, LTM6GXC, FMY0GME, BEART, C8YEKNHX     Type & Screen (If Applicable):  No results found for: LABABO, LABRH    Drug/Infectious Status (If Applicable):   No results found for: HIV, HEPCAB    COVID-19 Screening (If Applicable): No results found for: COVID19        Anesthesia Evaluation  Patient summary reviewed no history of anesthetic complications:   Airway: Mallampati: II  TM distance: >3 FB   Neck ROM: full  Mouth opening: > = 3 FB   Dental: normal exam         Pulmonary:Negative Pulmonary ROS breath sounds clear to auscultation                             Cardiovascular:    (+) hypertension:,         Rhythm: regular                      Neuro/Psych:   (+) psychiatric history:            GI/Hepatic/Renal:   (+) GERD:, morbid obesity          Endo/Other: Negative Endo/Other ROS                    Abdominal:             Vascular: negative vascular ROS. Other Findings:             Anesthesia Plan      general     ASA 3       Induction: intravenous. MIPS: Prophylactic antiemetics administered. Anesthetic plan and risks discussed with patient. Plan discussed with CRNA. DOS STAFF ADDENDUM:    Pt seen and examined, chart reviewed (including anesthesia, drug and allergy history). Anesthetic plan, risks, benefits, alternatives, and personnel involved discussed with patient. Patient verbalized an understanding and agrees to proceed. Plan discussed with care team members and agreed upon.     Yuriy Snowden MD  Staff Anesthesiologist  7:37 AM    Yuriy Snowden MD   2/21/2023

## 2023-02-22 ENCOUNTER — TELEPHONE (OUTPATIENT)
Dept: BARIATRICS/WEIGHT MGMT | Age: 43
End: 2023-02-22

## 2023-02-22 VITALS
HEART RATE: 75 BPM | OXYGEN SATURATION: 97 % | TEMPERATURE: 98.1 F | DIASTOLIC BLOOD PRESSURE: 92 MMHG | SYSTOLIC BLOOD PRESSURE: 152 MMHG | RESPIRATION RATE: 16 BRPM

## 2023-02-22 LAB
ANION GAP SERPL CALCULATED.3IONS-SCNC: 11 MMOL/L (ref 7–16)
BASOPHILS ABSOLUTE: 0.02 E9/L (ref 0–0.2)
BASOPHILS RELATIVE PERCENT: 0.1 % (ref 0–2)
BUN BLDV-MCNC: 10 MG/DL (ref 6–20)
CALCIUM SERPL-MCNC: 8.7 MG/DL (ref 8.6–10.2)
CHLORIDE BLD-SCNC: 104 MMOL/L (ref 98–107)
CHOLESTEROL, TOTAL: 171 MG/DL (ref 0–199)
CO2: 21 MMOL/L (ref 22–29)
CREAT SERPL-MCNC: 0.6 MG/DL (ref 0.5–1)
EOSINOPHILS ABSOLUTE: 0.01 E9/L (ref 0.05–0.5)
EOSINOPHILS RELATIVE PERCENT: 0.1 % (ref 0–6)
GFR SERPL CREATININE-BSD FRML MDRD: >60 ML/MIN/1.73
GLUCOSE BLD-MCNC: 96 MG/DL (ref 74–99)
HBA1C MFR BLD: 5.3 % (ref 4–5.6)
HCT VFR BLD CALC: 40.3 % (ref 34–48)
HDLC SERPL-MCNC: 58 MG/DL
HEMOGLOBIN: 13.5 G/DL (ref 11.5–15.5)
IMMATURE GRANULOCYTES #: 0.07 E9/L
IMMATURE GRANULOCYTES %: 0.5 % (ref 0–5)
LDL CHOLESTEROL CALCULATED: 98 MG/DL (ref 0–99)
LYMPHOCYTES ABSOLUTE: 2.45 E9/L (ref 1.5–4)
LYMPHOCYTES RELATIVE PERCENT: 16.3 % (ref 20–42)
MAGNESIUM: 2 MG/DL (ref 1.6–2.6)
MCH RBC QN AUTO: 31.8 PG (ref 26–35)
MCHC RBC AUTO-ENTMCNC: 33.5 % (ref 32–34.5)
MCV RBC AUTO: 94.8 FL (ref 80–99.9)
MONOCYTES ABSOLUTE: 1.26 E9/L (ref 0.1–0.95)
MONOCYTES RELATIVE PERCENT: 8.4 % (ref 2–12)
NEUTROPHILS ABSOLUTE: 11.2 E9/L (ref 1.8–7.3)
NEUTROPHILS RELATIVE PERCENT: 74.6 % (ref 43–80)
PDW BLD-RTO: 13.6 FL (ref 11.5–15)
PHOSPHORUS: 2.5 MG/DL (ref 2.5–4.5)
PLATELET # BLD: 290 E9/L (ref 130–450)
PMV BLD AUTO: 10 FL (ref 7–12)
POTASSIUM SERPL-SCNC: 4.4 MMOL/L (ref 3.5–5)
RBC # BLD: 4.25 E12/L (ref 3.5–5.5)
SODIUM BLD-SCNC: 136 MMOL/L (ref 132–146)
T4 FREE: 1.48 NG/DL (ref 0.93–1.7)
TRIGL SERPL-MCNC: 74 MG/DL (ref 0–149)
TSH SERPL DL<=0.05 MIU/L-ACNC: 0.53 UIU/ML (ref 0.27–4.2)
VLDLC SERPL CALC-MCNC: 15 MG/DL
WBC # BLD: 15 E9/L (ref 4.5–11.5)

## 2023-02-22 PROCEDURE — 80061 LIPID PANEL: CPT

## 2023-02-22 PROCEDURE — 83735 ASSAY OF MAGNESIUM: CPT

## 2023-02-22 PROCEDURE — 36415 COLL VENOUS BLD VENIPUNCTURE: CPT

## 2023-02-22 PROCEDURE — 80048 BASIC METABOLIC PNL TOTAL CA: CPT

## 2023-02-22 PROCEDURE — C9113 INJ PANTOPRAZOLE SODIUM, VIA: HCPCS | Performed by: SURGERY

## 2023-02-22 PROCEDURE — 84439 ASSAY OF FREE THYROXINE: CPT

## 2023-02-22 PROCEDURE — 85025 COMPLETE CBC W/AUTO DIFF WBC: CPT

## 2023-02-22 PROCEDURE — 83036 HEMOGLOBIN GLYCOSYLATED A1C: CPT

## 2023-02-22 PROCEDURE — 6360000002 HC RX W HCPCS: Performed by: SURGERY

## 2023-02-22 PROCEDURE — 84100 ASSAY OF PHOSPHORUS: CPT

## 2023-02-22 PROCEDURE — 84443 ASSAY THYROID STIM HORMONE: CPT

## 2023-02-22 PROCEDURE — A4216 STERILE WATER/SALINE, 10 ML: HCPCS | Performed by: SURGERY

## 2023-02-22 PROCEDURE — 2580000003 HC RX 258: Performed by: SURGERY

## 2023-02-22 PROCEDURE — 6370000000 HC RX 637 (ALT 250 FOR IP): Performed by: SURGERY

## 2023-02-22 RX ADMIN — SODIUM CHLORIDE 40 MG: 9 INJECTION, SOLUTION INTRAMUSCULAR; INTRAVENOUS; SUBCUTANEOUS at 09:59

## 2023-02-22 RX ADMIN — ENOXAPARIN SODIUM 40 MG: 100 INJECTION SUBCUTANEOUS at 09:46

## 2023-02-22 RX ADMIN — KETOROLAC TROMETHAMINE 30 MG: 30 INJECTION, SOLUTION INTRAMUSCULAR; INTRAVENOUS at 01:45

## 2023-02-22 RX ADMIN — ACETAMINOPHEN 650 MG: 160 SUSPENSION ORAL at 09:51

## 2023-02-22 RX ADMIN — SODIUM CHLORIDE, POTASSIUM CHLORIDE, SODIUM LACTATE AND CALCIUM CHLORIDE: 600; 310; 30; 20 INJECTION, SOLUTION INTRAVENOUS at 05:44

## 2023-02-22 RX ADMIN — KETOROLAC TROMETHAMINE 30 MG: 30 INJECTION, SOLUTION INTRAMUSCULAR; INTRAVENOUS at 09:47

## 2023-02-22 RX ADMIN — ACETAMINOPHEN 650 MG: 160 SUSPENSION ORAL at 01:45

## 2023-02-22 ASSESSMENT — PAIN - FUNCTIONAL ASSESSMENT: PAIN_FUNCTIONAL_ASSESSMENT: PREVENTS OR INTERFERES SOME ACTIVE ACTIVITIES AND ADLS

## 2023-02-22 ASSESSMENT — PAIN DESCRIPTION - FREQUENCY: FREQUENCY: CONTINUOUS

## 2023-02-22 ASSESSMENT — PAIN SCALES - GENERAL
PAINLEVEL_OUTOF10: 5
PAINLEVEL_OUTOF10: 6

## 2023-02-22 ASSESSMENT — PAIN DESCRIPTION - DESCRIPTORS: DESCRIPTORS: ACHING;TENDER;SORE

## 2023-02-22 ASSESSMENT — PAIN DESCRIPTION - PAIN TYPE: TYPE: ACUTE PAIN;SURGICAL PAIN

## 2023-02-22 ASSESSMENT — PAIN DESCRIPTION - ONSET: ONSET: ON-GOING

## 2023-02-22 ASSESSMENT — PAIN DESCRIPTION - LOCATION: LOCATION: ABDOMEN

## 2023-02-22 NOTE — TELEPHONE ENCOUNTER
Received call from patient with question about taking medications. Patient wanted to  make sure she could take Omeprazole since it is a capsule. I explained she can take it whole or open it up and sprinkle into her liquids to take. She also asked about the duloxetine as her PCP told her she could open it as well to take. I agreed she can take that as well.

## 2023-02-22 NOTE — CARE COORDINATION
CM note: discharge order noted, no identified discharge needs per IDR and chart review. Pt is post op day 1 gastric bypass, she is tolerating liquid diet and ambulating the halls. Pt lives with family, is independent with ADLs, has a PCP and medical insurance for medications, family will transport home.

## 2023-02-22 NOTE — PROGRESS NOTES
Internal Medicine Progress Note    [facility]    Tyree Ambrocio., BLANCHE Rowley D.O., SOCORRO Benoit D.O. Maybelle Hane, MSN, APRN, NP-C  Rachael Guajardo. Arsh Larson, MSN, APRN-CNP       Primary Care Physician: Fede Morton MD   Admitting Physician:  Silva Rodríguez MD  Admission date and time: 2/21/2023  6:20 AM    Room:  [address] [place], [place] [address]-  Admitting diagnosis: Morbid obesity ([facility] [E66.01]  S/P gastric bypass [Z98.84]    Patient Name: Valente Coats  MRN: [id number]    Date of Service: 2/22/2023     Subjective:  Mikki Ludwig is a 36 y.o. female who was seen and examined today,2/22/2023, at the bedside. Patient seem to doing well. Minimal amount of postop pain. Denies chest pain or shortness of breath. Probable discharge today    No family present during my examination. Review of System:   Constitutional:   Denies fever or chills, weight loss or gain, fatigue or malaise. HEENT:   Denies ear pain, sore throat, sinus or eye problems. Cardiovascular:   Denies any chest pain, irregular heartbeats, or palpitations. Respiratory:   Denies shortness of breath, coughing, sputum production, hemoptysis, or wheezing. Gastrointestinal:   Minimal abdominal discomfort  Genitourinary:    Denies any urgency, frequency, hematuria. Voiding  without difficulty. Extremities:   Denies lower extremity swelling, edema or cyanosis. Neurology:    Denies any headache or focal neurological deficits, Denies generalized weakness or memory difficulty. Psch:   Denies being anxious or depressed. Musculoskeletal:    Denies  myalgias, joint complaints or back pain. Integumentary:   Denies any rashes, ulcers, or excoriations. Denies bruising. Hematologic/Lymphatic:  Denies bruising or bleeding.     Physical Exam:  I/O this shift:  In: 691.1 [I.V.:691.1]  Out: 600 [Urine:600]    Intake/Output Summary (Last 24 hours) at 2/22/2023 1533  Last data filed at 2/22/2023 1228  Gross per 24 hour   Intake 3250.21 ml   Output 1700 ml   Net 1550.21 ml   I/O last 3 completed shifts: In: 4809.1 [P.O.:220; I.V.:4539.1; IV Piggyback:50]  Out: 3604 [Urine:1100; Blood:5]  No data found. Vital Signs:   Blood pressure (!) 152/92, pulse 75, temperature 98.1 °F (36.7 °C), temperature source Oral, resp. rate 16, last menstrual period 02/15/2023, SpO2 97 %, not currently breastfeeding. General appearance:  Alert, responsive, oriented to person, place, and time. Well preserved, alert, no distress. Head:  Normocephalic. No masses, lesions or tenderness. Eyes:  PERRLA. EOMI. Sclera clear. Buccal mucosa moist.  ENT:  Ears normal. Mucosa normal.  Neck:    Supple. Trachea midline. No thyromegaly. No JVD. No bruits. Heart:    Rhythm regular. Rate controlled. No murmurs. Lungs:    Symmetrical. Clear to auscultation bilaterally. No wheezes. No rhonchi. No rales. Abdomen:   Minimal abdominal discomfort. Postsurgical findings  Extremities:    Peripheral pulses present. No peripheral edema. No ulcers. No cyanosis. No clubbing. Neurologic:    Alert x 3. No focal deficit. Cranial nerves grossly intact. No focal weakness. Psych:   Behavior is normal. Mood appears normal. Speech is not rapid and/or pressured. Musculoskeletal:   Spine ROM normal. Muscular strength intact. Gait not assessed. Integumentary:  No rashes  Skin normal color and texture.   Genitalia/Breast:  Deferred    Medication:  Scheduled Meds:   sodium chloride flush  5-40 mL IntraVENous 2 times per day    acetaminophen  650 mg Oral Q6H    ketorolac  30 mg IntraVENous Q6H    scopolamine  1 patch TransDERmal Q72H    pantoprazole (PROTONIX) 40 mg injection  40 mg IntraVENous Daily    enoxaparin  40 mg SubCUTAneous Daily     Continuous Infusions:   lactated ringers IV soln 125 mL/hr at 02/22/23 0648    sodium chloride         Objective Data:  Recent Labs 02/22/23  0522   WBC 15.0*   RBC 4.25   HGB 13.5   HCT 40.3   MCV 94.8   MCH 31.8   MCHC 33.5   RDW 13.6      MPV 10.0     Lab Results   Component Value Date/Time     02/22/2023 05:22 AM    K 4.4 02/22/2023 05:22 AM    K 4.3 02/17/2023 09:15 AM     02/22/2023 05:22 AM    CO2 21 02/22/2023 05:22 AM    ANIONGAP 11 02/22/2023 05:22 AM    GLUCOSE 96 02/22/2023 05:22 AM    BUN 10 02/22/2023 05:22 AM    CREATININE 0.6 02/22/2023 05:22 AM    GFRAA >60 10/14/2022 08:30 AM    LABGLOM >60 02/22/2023 05:22 AM    CALCIUM 8.7 02/22/2023 05:22 AM    BILITOT 0.3 02/17/2023 09:15 AM    ALT 14 02/17/2023 09:15 AM    AST 12 02/17/2023 09:15 AM    ALKPHOS 80 02/17/2023 09:15 AM    PROT 7.1 02/17/2023 09:15 AM    LABALBU 4.2 02/17/2023 09:15 AM     Recent Labs     02/22/23  0522   MG 2.0      Lab Results   Component Value Date/Time    PHOS 2.5 02/22/2023 05:22 AM     No results for input(s): TROPONINI in the last 72 hours. Wound Documentation:   Incision 06/28/16 Vagina (Active)   Number of days: 8080       Assessment:    Super morbid obesity with BMI 64.88 kg per metered squared with plan for Sd-en-Y gastric bypass February 21, 2023 by Dr. Steffi Tamez hypertension  Hyperlipidemia  Obstructive sleep apnea  Nonalcoholic steatohepatitis  Polycystic ovarian syndrome      Plan:     Patient doing well postoperatively  Lab reviewed with patient  Tolerating diet  Ambulating satisfactory  Denies shortness of breath  Probable discharge today      More than 50% of my time was spent at the bedside counseling/coordinating care with the patient and/or family with face to face contact. This time was spent reviewing notes and laboratory data as well as instructing and counseling the patient. Time I spent with the family or surrogate(s) is included only if the patient was incapable of providing the necessary information or participating in medical decisions.  I also discussed the differential diagnosis and all of the proposed management plans with the patient and individuals accompanying the patient. I am readily available for any further decision-making and intervention.        Antonio Rowan DO, [email or web address].[email or web address].I.  2/22/2023  3:33 PM

## 2023-02-22 NOTE — DISCHARGE SUMMARY
Physician Discharge Summary     Patient ID:  Diana Motta  [id number]  34 y.o.  1980    Admit date: 2/21/2023    Discharge date and time: No discharge date for patient encounter. Admitting Physician: Niya Duncan MD     Admission Diagnoses: Morbid obesity ([facility] [E66.01]  S/P gastric bypass [Z98.84]    Discharge Diagnoses: Principal Problem:    S/P gastric bypass  Active Problems:    Maternal morbid obesity, antepartum ([facility]  Resolved Problems:    * No resolved hospital problems. *      Admission Condition: good    Discharged Condition: stable    Indication for Admission:     Hospital Course/Procedures/Operation/treatments:   2/21 patient was admitted for laparoscopic robotic assisted Sd-en-Y gastric bypass. The procedure was uncomplicated and she was started on clear liquid diet postoperatively. She tolerated her clear liquid diet without nausea or vomiting and on postoperative day 1 she was discharged home with plans to advance her diet to full liquid diet on postoperative day 2. Consults:   IP CONSULT TO HOSPITALIST    Significant Diagnostic Studies:   No results found. Discharge Exam:  GENERAL:  NAD. A&Ox3. HEAD:  Normocephalic. Atraumatic. EYES:   No scleral icterus. PERRL. LUNGS:  No increased work of breathing. CARDIOVASCULAR: RR  ABDOMEN:  Soft, non-distended, non-tender. No guarding, rigidity, rebound. Incisions clean dry intact with skin glue  EXTREMITIES:   MAEx4. Atraumatic.          Disposition: home    In process/preliminary results:  Outstanding Order Results       Date and Time Order Name Status Description    2/22/2023  8:55 AM Basic Metabolic Panel Preliminary     2/21/2023 12:00 AM Surgical Pathology In process             Patient Instructions:   Current Discharge Medication List             Details   ondansetron (ZOFRAN-ODT) 4 MG disintegrating tablet Take 1 tablet by mouth every 8 hours as needed for Nausea or Vomiting  Qty: 15 tablet, Refills: 0    Associated Diagnoses: PONV (postoperative nausea and vomiting)      omeprazole (PRILOSEC) 40 MG delayed release capsule Take 40 mg by mouth daily      acetaminophen (TYLENOL) 500 MG tablet Take 1,000 mg by mouth as needed for Pain      DULoxetine (CYMBALTA) 60 MG extended release capsule Take 60 mg by mouth nightly      LORazepam (ATIVAN) 0.5 MG tablet Take 0.5 mg by mouth every 6 hours as needed for Anxiety. Dr. Awilda Reyna MD, MS  Discharge Instructions for Bariatric Surgery  Sd-en-Y gastric bypass OR Sleeve Gastrectomy       HOME CARE   Keep the incision area clean and dry. The glue on the incision is waterproof so you can shower. Do not remove the surgical glue. Leave the incisions open to air. Only cover if drainage occurs. Place ice on painful incision for 1-2 days. Make sure you know how to use and continue to use your incentive spirometer every hour while awake at home. DIET   Drink frequently and always carry fluids with you while awake to maintain hydration. Follow the diet instructions that you received in your Nutrition Education Manual for the dates and times of when to start your Bariatric Clear Liquids and Bariatric Full Liquid Diet along with the protein supplements. DO NOT take the Bariatric Multivitamins, Iron, Calcium, Vitamin D or Vitamin b12 supplements until instructed to do so at your 2 week follow up appointment with your surgeon. Slow down intake of liquids if there is chest pressure, acid or fullness. Drinking too fast or too much at one time can result in pain and vomiting. You may notice increased gas or changes in your bowel habits during the first month after surgery. Keep the bowels soft with stool softeners at first, then switch to Metamucil. If you are not able to drink 64 ounces of fluid a day, you will develop constipation. Keep the bowels moving daily.     PHYSICAL ACTIVITY   Walk frequently and keep your legs elevated when sitting to prevent blood clots in the legs. Discomfort at the incisions is normal with increasing activity. Severe pain at the incisions is not expected and you should avoid activities that result in severe pain. Increase your activity gradually, walking at least 10 minutes every hour you are awake. Do not drive while taking narcotic pain medication as this can cause drowsiness. You must be narcotic free for at least 24 hours before driving. Do not bend, twist, pull, or lift over 20 pounds for the first 2 weeks after surgery. Then for the next 2 weeks after that, do not bend, twist, pull, or lift over 50 pounds. If anything causes pain or discomfort, stop! Breathe deeply every hour to prevent pneumonia. Continue to use your incentive spirometer at home as this will help to prevent pneumonia as well. Medications   Restart blood thinners in 24 hours if no signs of bleeding. Take Tylenol for pain. Take Colace 100 mg twice a day. Take Ondansetron (Zofran), Omeprazole (Prilosec), or Sucralfate (Carafate) as prescribed. Follow up with Primary Care Physician (PCP)regarding home medications you were taking prior to surgery or with the prescribing physician if not your PCP. Extended-release medications are not recommended. Your PCP should convert to another medication if possible. DO NOT STOP ANY PRESCRIBED MEDICATION WITHOUT TALKING TO YOUR DOCTOR. If diabetic, check blood sugars as ordered by PCP or Endocrinologist. Follow up with PCP or Endocrinologist regarding diabetic medications. Make sure you take any medicines you were on for depression or anxiety. FOLLOW-UP   Follow-up appointment with surgeon 10-14 days after surgery. Complete lab work prior to this appointment. Follow-up with PCP and/or Endocrinologist prior to seeing surgeon.     CALL [facility] WEIGHT LOSS OFFICE [phone] IF ANY OF THE FOLLOWING OCCURS TO BE SEEN IN THE OFFICE:   Signs of infection, fever, chills, redness, swelling, increasing pain, or discharge at the incision site. Nausea not relieved by medication, frequent vomiting and not able to keep anything down, and  excessive diarrhea (more than 8 episodes in 24 hours). Pain, burning, urgency, or frequency of urination or blood in the urine. Pain and/or swelling in your feet, calves, or legs. You have pain that does not get better after taking pain medication   You cannot pass stool or gas. You are sick to your stomach and cannot drink fluids. You have loose stitches, or your incision comes open. You have signs of a blood clot such as:  - pain in your calf, back of the knee, thigh, or groin. - redness and swelling in your leg or groin   If you feel dehydrated, call the office to have IV fluids ordered. Signs of dehydration include dizziness, tiredness, dry mouth, dry lips, decreased urine output, dark colored urine, headache, feeling thirsty and muscle cramps.  This can occur if you are not meeting your daily fluid intake goal.   Watch closely for changes in your health, and be sure to contact your doctor if you have any problems    FOR ANY OF THE FOLLOWING, GO TO THE ER:   SHORTNESS OF BREATH   CHEST PAIN   EXCESSIVE BLEEDING   SEVERE ABDOMINAL PAIN      IN CASE OF EMERGENCY, CALL 911 IMMEDIATELY      Follow up:   Lima You MD  [address] [facility]  [phone]    Call  As needed       Signed:  Electronically signed by Billy Ward MD on 2/22/23 at 7:19 AM EST

## 2023-02-22 NOTE — PLAN OF CARE
Problem: Discharge Planning  Goal: Discharge to home or other facility with appropriate resources  2/22/2023 1155 by Josh Perez RN  Outcome: Progressing     Problem: Pain  Goal: Verbalizes/displays adequate comfort level or baseline comfort level  2/22/2023 1155 by Josh Perez RN  Outcome: Progressing     Problem: Safety - Adult  Goal: Free from fall injury  Outcome: Progressing

## 2023-02-23 ENCOUNTER — TELEPHONE (OUTPATIENT)
Dept: BARIATRICS/WEIGHT MGMT | Age: 43
End: 2023-02-23

## 2023-02-23 NOTE — TELEPHONE ENCOUNTER
Post op questions:    Nausea/vomiting present (YES/NO): no    Fever > 101/chills present (YES/NO): no    Tolerating liquids (YES/NO): yes   Ounces per day (goal is 64-90 ounces per day): yes   Protein shakes (start POD #3, 4 small meals of 3 ounces each for total of 12 ounces per day with goal of 60-80 grams per day): yes    Taking post op medications, Carafate/Omeprazole & Zofran, Colace (YES/NO): yes, no Zofran    Is patient up and walking (YES/NO): yes    Having bowel movements (YES/NO): not yet, starting Colace, may add Murelax    Are incisions healing well (YES/NO): yes    Having any problems or concerns that need to be addressed: no, doing well

## 2023-02-24 ENCOUNTER — TELEPHONE (OUTPATIENT)
Dept: BARIATRICS/WEIGHT MGMT | Age: 43
End: 2023-02-24

## 2023-02-24 NOTE — TELEPHONE ENCOUNTER
[facility]:  RYGB or LSG - Dietary Phone Follow-up Form:  Sx Date:2/21/23  rs/p RYGB        Current Diet:_____________Full Liquids_________________       Patient's symptoms include the following:    Pt states no symptoms feels fine no problems noted      Pt states  he / she has the following symptoms:    Other soreness in abdomen  Pt started his / her Colace 100 mgs twice daily -  yes  Pt is taking PPI Medications or Carafate as instructed -  yes      Hunger during the liquid phase no   Reports no restriction during the liquid phase no  Reports moderate restriction during liquid phase no  Reports severe restriction during liquid phase no  Can't tolerate liquids no  Food gets stuck frequently no    Reflux Symptoms no       Water Intake:2 bottles today (only 1 yesterday)  improving   Other Beverage: none, protein shakes  Pt adding protein powder to pudding  Pt  states denies dehydration on today's date yes 2/24/23    Protein Supplement: Ezekiel Edouard reviewed pt can start his / her protein supplement on today. Ezekiel Edouard reviewed how pt can mix his / her protein supplement - 2 scoops Nectar mixed in 12 oz Fat Free Fair Life Milk or  12 oz of Water broken down into 4 meals 3 oz in size. Pt verbalized understanding. How many times a day do you take your Protein Supplement: 4    Instructed per Standing Orders: yes,  Bariatric Full Liquid Diet . Ezekiel Edouard reviewed Bar Full Liquid Diet and reinforced diet concepts. Pt has education book and handouts and states he/ she is following the instruction given. Use of protein supplement was reviewed with how to mix his / her protein supplement. Pt verbalized understanding. Pt instructed to call with any dietary questions.      POC D/T problem noted above: none identified    Surgeon Norified:no    Patient Response:  Verbalized understanding of the above instruction: yes,   Will keep detailed food records for 2 week f/u appt: yes,   Will return to [facility] for his her 2 week f/u appointment - Reminded pt to have labs drawn

## 2023-02-27 ENCOUNTER — TELEPHONE (OUTPATIENT)
Dept: BARIATRICS/WEIGHT MGMT | Age: 43
End: 2023-02-27

## 2023-02-27 NOTE — TELEPHONE ENCOUNTER
Post op questions:    Nausea/vomiting present (YES/NO): NO    Fever > 101/chills present (YES/NO): NO    Tolerating liquids (YES/NO): yes, needs to drink more, told her to add Gator Aid  Ounces per day (goal is 64-90 ounces per day): not yet, trying to reach goal  Protein shakes (start POD #3, 4 small meals of 3 ounces each for total of 12 ounces per day with goal of 60-80 grams per day): yes    Taking post op medications, Carafate/Omeprazole & Zofran, Colace (YES/NO): no Zofran     Is patient up and walking (YES/NO): yes    Having bowel movements (YES/NO): yes Yes     Are incisions healing well (YES/NO): yes    Having any problems or concerns that need to be addressed: no

## 2023-03-08 ENCOUNTER — HOSPITAL ENCOUNTER (OUTPATIENT)
Age: 43
Discharge: HOME | End: 2023-03-08
Payer: COMMERCIAL

## 2023-03-08 DIAGNOSIS — K91.2 MALNUTRITION FOLLOWING GASTROINTESTINAL SURGERY: ICD-10-CM

## 2023-03-08 LAB
ALBUMIN SERPL-MCNC: 4.1 G/DL (ref 3.5–5.2)
ALP BLD-CCNC: 79 U/L (ref 35–104)
ALT SERPL-CCNC: 18 U/L (ref 0–32)
ANION GAP SERPL CALCULATED.3IONS-SCNC: 11 MMOL/L (ref 7–16)
AST SERPL-CCNC: 21 U/L (ref 0–31)
BILIRUB SERPL-MCNC: 0.4 MG/DL (ref 0–1.2)
BUN BLDV-MCNC: 11 MG/DL (ref 6–20)
CALCIUM SERPL-MCNC: 9.3 MG/DL (ref 8.6–10.2)
CHLORIDE BLD-SCNC: 105 MMOL/L (ref 98–107)
CHOLESTEROL, TOTAL: 189 MG/DL (ref 0–199)
CO2: 25 MMOL/L (ref 22–29)
CREAT SERPL-MCNC: 0.7 MG/DL (ref 0.5–1)
GFR SERPL CREATININE-BSD FRML MDRD: >60 ML/MIN/1.73
GLUCOSE BLD-MCNC: 94 MG/DL (ref 74–99)
HCT VFR BLD CALC: 47.1 % (ref 34–48)
HDLC SERPL-MCNC: 47 MG/DL
HEMOGLOBIN: 15.1 G/DL (ref 11.5–15.5)
LDL CHOLESTEROL CALCULATED: 121 MG/DL (ref 0–99)
MCH RBC QN AUTO: 31.3 PG (ref 26–35)
MCHC RBC AUTO-ENTMCNC: 32.1 % (ref 32–34.5)
MCV RBC AUTO: 97.7 FL (ref 80–99.9)
PDW BLD-RTO: 13.1 FL (ref 11.5–15)
PLATELET # BLD: 352 E9/L (ref 130–450)
PMV BLD AUTO: 10.7 FL (ref 7–12)
POTASSIUM SERPL-SCNC: 4.2 MMOL/L (ref 3.5–5)
RBC # BLD: 4.82 E12/L (ref 3.5–5.5)
SODIUM BLD-SCNC: 141 MMOL/L (ref 132–146)
TOTAL PROTEIN: 7.5 G/DL (ref 6.4–8.3)
TRIGL SERPL-MCNC: 103 MG/DL (ref 0–149)
VLDLC SERPL CALC-MCNC: 21 MG/DL
WBC # BLD: 8.3 E9/L (ref 4.5–11.5)

## 2023-03-08 PROCEDURE — 36415 COLL VENOUS BLD VENIPUNCTURE: CPT

## 2023-03-08 PROCEDURE — 85027 COMPLETE CBC AUTOMATED: CPT

## 2023-03-08 PROCEDURE — 80053 COMPREHEN METABOLIC PANEL: CPT

## 2023-03-08 PROCEDURE — 80061 LIPID PANEL: CPT

## 2023-03-10 ENCOUNTER — OFFICE VISIT (OUTPATIENT)
Dept: BARIATRICS/WEIGHT MGMT | Age: 43
End: 2023-03-10

## 2023-03-10 ENCOUNTER — TELEPHONE (OUTPATIENT)
Dept: BARIATRICS/WEIGHT MGMT | Age: 43
End: 2023-03-10

## 2023-03-10 ENCOUNTER — SCHEDULED TELEPHONE ENCOUNTER (OUTPATIENT)
Dept: BARIATRICS/WEIGHT MGMT | Age: 43
End: 2023-03-10

## 2023-03-10 VITALS
BODY MASS INDEX: 51.91 KG/M2 | TEMPERATURE: 97.1 F | WEIGHT: 293 LBS | DIASTOLIC BLOOD PRESSURE: 100 MMHG | HEIGHT: 63 IN | HEART RATE: 75 BPM | RESPIRATION RATE: 20 BRPM | SYSTOLIC BLOOD PRESSURE: 130 MMHG

## 2023-03-10 DIAGNOSIS — E66.01 MORBID OBESITY DUE TO EXCESS CALORIES (HCC): ICD-10-CM

## 2023-03-10 DIAGNOSIS — E86.0 DEHYDRATION: Primary | ICD-10-CM

## 2023-03-10 DIAGNOSIS — Z71.3 DIETARY COUNSELING: Primary | ICD-10-CM

## 2023-03-10 PROCEDURE — 99999 PR OFFICE/OUTPT VISIT,PROCEDURE ONLY: CPT

## 2023-03-10 PROCEDURE — 99024 POSTOP FOLLOW-UP VISIT: CPT | Performed by: SURGERY

## 2023-03-10 NOTE — PROGRESS NOTES
Medical Nutrition Therapy (MNT) Assessment     Pt. Name: Terry Sandra   Date:3/10/2023  F/U Appt: Sx Type 2 week rygb      LMP 02/15/2023 (Exact Date)  Height:  5' 3\" Weight:  347 lbs   IBW:ideal body weight   147 lbs % EBWL: 7%     Wt Readings from Last 3 Encounters:   03/10/23 (!) 347 lb (157.4 kg)   02/17/23 (!) 366 lb 4 oz (166.1 kg)   02/10/23 (!) 363 lb (164.7 kg)                     Protein supplements:   Daily Protein needs (based on 1.0 gram per kg of IBW)  67-77 grams       Subjective:                    MNT ADVANCE TO:     Bariatric puree diet with MVI, Calcium & Protein Supplements          [facility] Bowel Protocol:  no - Diarrhea  No - Constipation  How much plain water are you drinking daily 32-40 oz   Discussed in detail how important it is to increase water intake to 64 oz or more daily and to add SF flavored water as able, in addition to the water intake. Pt is experiencing some dizziness and feeling weak. Discussed how to get some carbohydrate intake if she has a low BS level, however, noted that her problem may more likely be dehydration. Pt voiced understanding. How many meals a day 6 / Portions Sizes of Meals are 3 oz         3/8/23 Labs: LDL 121H. Supplements: pt will begin tomorrow, 3/11/23. Estimated Daily Nutritional Needs: Based on Bariatric procedure for Wt. Loss  Energy: Will be calculated at Maintenance Stage    Protein: 60 - 80 gms Daily    MNT Plan and Additional Education: RD/LD reviewed Bariatric Puree Diet and how to puree food. Encouraged pt to meet protein and fluid needs daily. Pt. verbalized understanding. Handouts given. Pt. Instructed to call with questions.     MEDICAL NUTRITION THERAPY (MNT) - Nutrition Care Plan   (The patient has been educated and given written education material that reinforces the following dietary guidelines for Bariatric Surgery)  Goal:  Patient able to verbalize the following dietary concepts:  3 to 4 ounce portions per meal     6 small meals daily      60 to 80 grams of protein   48 to 64 ounces of fluid daily (water)     Always consume protein item first with all meals   Pt is aware wt loss is pt controlled. Slow Meals - 30-35 chews per bite / Meals 30 - 45 minutes long            The RD / LD reviewed with the patient that the dietary goals of the bariatric patient is to ensure that patient is able to meet established nutritional needs for a Bariatric Surgery  Patient at this time in order to promote healing, prevent significant weight changes, prevent skin breakdown and abnormal lab values, prevent complications, and tolerance to diet and texture of foods. Pt is able to identify proper food choices and needed changes and is able to explain proper food preparation. RD / LD reviewed compliance with assigned diet stages, volume of food and drink consumed, timing of meals, amount of protein and energy intake, daily vitamin and mineral supplementation, identify food cravings and any adverse reactions associated with intake of food and drink. RD / LD uses behavioral tools such as goal setting, MI, and self-monitoring, to reinforce the anatomic and physiologic effects of the surgery, so that the described behaviors become more of a habit not simply a reaction to the altered anatomy. Care Plan:   Rd/Ld Addressed Food Records or 24-Hour Recall  Rd / Ld encouraged patient to exercise at least 30 minutes daily  Rd / Ld instructed patient on how to increase oral protein intake within the diet. Pt. can verbalize he / she will need to consume 60 - 80 grams. Rd / Ld instructed the patient on how to increase the use of protein supplements within the diet. Pt. was instructed on how to increase water intake. Patient will need to consume 48 - 64 oz. of just plain water in addition to 30 oz. of non-caloric beverages.   Handouts given to patient  RD / LD encouraged oral intake    RD / LD encouraged pt. to keep food records. Pt. is able to verbalize diet concepts         Yes - Pt. diet was advanced to the following stage ( See above)     Good - Dietary Compliance  Doing well. Pt instructed that priority now is increase water/fluids to meet daily goals.

## 2023-03-10 NOTE — PROGRESS NOTES
Richardine Dakins  3/10/2023  Laparoscopic Sd-en- Y Gastric Bypass  Two weeks Post-Op Follow-up. Subjective:   Richardine Dakins is a 36 y.o. female is two weeks post Laparoscopic Sd-en-Y Gastric Bypass. The patient is not having any pain. Reports no problems with swallowing liquids, bowel movements, voiding, or the wounds. She is not having swallowing difficulty. She is not meeting fluid recommendations of at least 64 ounces per day and is meeting protein recommendations. Exercise: no regular exercise. (!) 347 lb (157.4 kg) Today's weight represents a weight loss of 16 pounds since surgery. Prior to Admission medications    Medication Sig Start Date End Date Taking? Authorizing Provider   ondansetron (ZOFRAN-ODT) 4 MG disintegrating tablet Take 1 tablet by mouth every 8 hours as needed for Nausea or Vomiting 2/10/23  Yes David Morales MD   omeprazole (PRILOSEC) 40 MG delayed release capsule Take 40 mg by mouth daily   Yes Historical Provider, MD   acetaminophen (TYLENOL) 500 MG tablet Take 1,000 mg by mouth as needed for Pain   Yes Historical Provider, MD   DULoxetine (CYMBALTA) 60 MG extended release capsule Take 60 mg by mouth nightly   Yes Historical Provider, MD   LORazepam (ATIVAN) 0.5 MG tablet Take 0.5 mg by mouth every 6 hours as needed for Anxiety.    Yes Historical Provider, MD        Physical exam:  VITALS: BP (!) 130/100 (Site: Left Upper Arm, Position: Sitting, Cuff Size: Large Adult)   Pulse 75   Temp 97.1 °F (36.2 °C) (Temporal)   Resp 20   Ht 5' 3\" (1.6 m)   Wt (!) 347 lb (157.4 kg)   LMP 02/15/2023 (Exact Date)   BMI 61.47 kg/m²    General appearance: alert, appears stated age and cooperative  Head: Normocephalic, without obvious abnormality, atraumatic  Neck: no adenopathy, no carotid bruit, no JVD, supple, symmetrical, trachea midline and thyroid not enlarged, symmetric, no tenderness/mass/nodules  Lungs: clear to auscultation bilaterally  Heart: regular rate and rhythm  Abdomen:  Incisions healing well, surgical glue in place, no allergic reaction, no cellulitis  Extremities: extremities normal, atraumatic, no cyanosis or edema    Assessment:    She is two weeks post laparoscopic gastric bypass. Not taking in enough fluids (40oz only), drhydration    Plan:   Doing well. Walk as much as possible but keep your legs elevated when sitting. Continue deep breathing exercizes. Transition to the high protein Pureed-liquid diet. Continue drinking 1 oz every 10-15 minutes to prevent dehydration. Slowly increase this amount as tolerated. Aim for 60 gm Protein and 90 oz of liquids per day. Slow down if you feel chest pressure, acid or fullness. Track protein and fluid intake and bring to your next appointment. Follow up in 4 weeks and call the clinic if questions arise in the meantime. Repeat labs in one month. Make sure the bowel move daily by taking fiber such as Metamucil.     Send for IVF therapy    Physician Signature: Electronically signed by Dr. Cindy Hunter MD

## 2023-03-10 NOTE — PROGRESS NOTES
2 week post op LRYGB. Water intake is 32-40 oz, having bowel movements, getting protein through shakes, pt states that her incisions are healing well.

## 2023-03-10 NOTE — PATIENT INSTRUCTIONS
Please continue to take your vitamin and mineral supplements as instructed.      If you received a blood work prescription today for laboratory monitoring due prior to your next routine follow-up visit, please have this blood work obtained 10 to 14 days prior to your next visit.  It is important to fast for 12 hours prior to routine weight loss surgery blood work, EXCEPT for drinking water, to ensure accuracy of results.    Please report nausea, vomiting, abdominal pain, or any other problems you experience to your surgeon.  For problems related to weight loss surgery, it is best to go to [facility] Emergency Department and have your surgeon paged.

## 2023-03-13 DIAGNOSIS — E86.0 DEHYDRATION: ICD-10-CM

## 2023-03-13 RX ORDER — 0.9 % SODIUM CHLORIDE 0.9 %
2000 INTRAVENOUS SOLUTION INTRAVENOUS ONCE
Status: CANCELLED | OUTPATIENT
Start: 2023-03-14 | End: 2023-03-14

## 2023-03-17 ENCOUNTER — HOSPITAL ENCOUNTER (OUTPATIENT)
Dept: INFUSION THERAPY | Age: 43
Setting detail: INFUSION SERIES
Discharge: HOME | End: 2023-03-17
Payer: COMMERCIAL

## 2023-03-17 VITALS
OXYGEN SATURATION: 97 % | TEMPERATURE: 97 F | HEART RATE: 91 BPM | DIASTOLIC BLOOD PRESSURE: 73 MMHG | WEIGHT: 293 LBS | SYSTOLIC BLOOD PRESSURE: 143 MMHG | HEIGHT: 63 IN | BODY MASS INDEX: 51.91 KG/M2 | RESPIRATION RATE: 18 BRPM

## 2023-03-17 DIAGNOSIS — E86.0 DEHYDRATION: Primary | ICD-10-CM

## 2023-03-17 PROCEDURE — 96360 HYDRATION IV INFUSION INIT: CPT

## 2023-03-17 PROCEDURE — 2580000003 HC RX 258: Performed by: SURGERY

## 2023-03-17 PROCEDURE — 96361 HYDRATE IV INFUSION ADD-ON: CPT

## 2023-03-17 RX ORDER — 0.9 % SODIUM CHLORIDE 0.9 %
2000 INTRAVENOUS SOLUTION INTRAVENOUS ONCE
Status: COMPLETED | OUTPATIENT
Start: 2023-03-17 | End: 2023-03-17

## 2023-03-17 RX ORDER — 0.9 % SODIUM CHLORIDE 0.9 %
2000 INTRAVENOUS SOLUTION INTRAVENOUS ONCE
Status: CANCELLED | OUTPATIENT
Start: 2023-03-17 | End: 2023-03-17

## 2023-03-17 RX ADMIN — SODIUM CHLORIDE 2000 ML: 9 INJECTION, SOLUTION INTRAVENOUS at 11:45

## 2023-03-17 ASSESSMENT — PAIN SCALES - GENERAL: PAINLEVEL_OUTOF10: 0

## 2023-03-17 NOTE — PROGRESS NOTES
Patient here for hydration  TWO LITERS  NSS Therapy. Tolerated hydration well. After infusion verbalizes felt WITHOUT DIFFICULTY . Reviewed therapy plan, offered education material and/or discharge material, reviewed medication information and signs and symptoms  and educated on possible side effects, verbalizes good knowledge of current plan patient verbalizes understanding, and has no signs or symptoms to report at this time. Patient discharged. Patient alert and oriented x3. No distress noted. Vital signs stable. Patient denies any new or worsening pain. Patient denies any needs. All questions answered. Next appointment scheduled.    [facility] opy of AVS.

## 2023-03-29 ENCOUNTER — TELEPHONE (OUTPATIENT)
Dept: BARIATRICS/WEIGHT MGMT | Age: 43
End: 2023-03-29

## 2023-03-29 NOTE — TELEPHONE ENCOUNTER
Received message from patient that she is having issues with eating. Spoke with patient who states that any meat she attempts to eat, she throws up. She also is not tolerating the protein drinks and is very nauseated with those. No issues with any other foods that she is eating. Explained that I would discuss with dietician for further recommendations. She is following the pureed diet.

## 2023-04-01 ENCOUNTER — APPOINTMENT (OUTPATIENT)
Dept: CT IMAGING | Age: 43
End: 2023-04-01
Payer: COMMERCIAL

## 2023-04-01 ENCOUNTER — HOSPITAL ENCOUNTER (EMERGENCY)
Age: 43
Discharge: HOME | End: 2023-04-01
Attending: EMERGENCY MEDICINE
Payer: COMMERCIAL

## 2023-04-01 VITALS
HEART RATE: 86 BPM | TEMPERATURE: 97.6 F | DIASTOLIC BLOOD PRESSURE: 93 MMHG | RESPIRATION RATE: 19 BRPM | WEIGHT: 293 LBS | SYSTOLIC BLOOD PRESSURE: 153 MMHG | OXYGEN SATURATION: 99 % | BODY MASS INDEX: 60.76 KG/M2

## 2023-04-01 DIAGNOSIS — R11.2 NAUSEA VOMITING AND DIARRHEA: Primary | ICD-10-CM

## 2023-04-01 DIAGNOSIS — N30.01 ACUTE CYSTITIS WITH HEMATURIA: ICD-10-CM

## 2023-04-01 DIAGNOSIS — R19.7 NAUSEA VOMITING AND DIARRHEA: Primary | ICD-10-CM

## 2023-04-01 DIAGNOSIS — K76.0 HEPATIC STEATOSIS: ICD-10-CM

## 2023-04-01 LAB
ALBUMIN SERPL-MCNC: 4.2 G/DL (ref 3.5–5.2)
ALP SERPL-CCNC: 103 U/L (ref 35–104)
ALT SERPL-CCNC: 18 U/L (ref 0–32)
ANION GAP SERPL CALCULATED.3IONS-SCNC: 16 MMOL/L (ref 7–16)
AST SERPL-CCNC: 16 U/L (ref 0–31)
BACTERIA URNS QL MICRO: ABNORMAL /HPF
BASOPHILS # BLD: 0.04 E9/L (ref 0–0.2)
BASOPHILS NFR BLD: 0.3 % (ref 0–2)
BILIRUB SERPL-MCNC: 0.5 MG/DL (ref 0–1.2)
BILIRUB UR QL STRIP: ABNORMAL
BUN SERPL-MCNC: 9 MG/DL (ref 6–20)
CALCIUM SERPL-MCNC: 9.8 MG/DL (ref 8.6–10.2)
CHLORIDE SERPL-SCNC: 104 MMOL/L (ref 98–107)
CHP ED QC CHECK: NORMAL
CLARITY UR: ABNORMAL
CO2 SERPL-SCNC: 21 MMOL/L (ref 22–29)
COLOR UR: ABNORMAL
CREAT SERPL-MCNC: 0.8 MG/DL (ref 0.5–1)
CRYSTALS URNS MICRO: ABNORMAL /HPF
EOSINOPHIL # BLD: 0.11 E9/L (ref 0.05–0.5)
EOSINOPHIL NFR BLD: 1 % (ref 0–6)
ERYTHROCYTE [DISTWIDTH] IN BLOOD BY AUTOMATED COUNT: 13.5 FL (ref 11.5–15)
GLUCOSE BLD-MCNC: 84 MG/DL
GLUCOSE SERPL-MCNC: 92 MG/DL (ref 74–99)
GLUCOSE UR STRIP-MCNC: 100 MG/DL
HCG, URINE, POC: NEGATIVE
HCT VFR BLD AUTO: 52.2 % (ref 34–48)
HGB BLD-MCNC: 16.6 G/DL (ref 11.5–15.5)
HGB UR QL STRIP: ABNORMAL
IMM GRANULOCYTES # BLD: 0.04 E9/L
IMM GRANULOCYTES NFR BLD: 0.3 % (ref 0–5)
KETONES UR STRIP-MCNC: >=80 MG/DL
LACTATE BLDV-SCNC: 1.5 MMOL/L (ref 0.5–2.2)
LEUKOCYTE ESTERASE UR QL STRIP: ABNORMAL
LIPASE: 17 U/L (ref 13–60)
LYMPHOCYTES # BLD: 1.76 E9/L (ref 1.5–4)
LYMPHOCYTES NFR BLD: 15.3 % (ref 20–42)
Lab: NORMAL
MCH RBC QN AUTO: 31.4 PG (ref 26–35)
MCHC RBC AUTO-ENTMCNC: 31.8 % (ref 32–34.5)
MCV RBC AUTO: 98.7 FL (ref 80–99.9)
METER GLUCOSE: 84 MG/DL (ref 74–99)
MONOCYTES # BLD: 0.8 E9/L (ref 0.1–0.95)
MONOCYTES NFR BLD: 7 % (ref 2–12)
NEGATIVE QC PASS/FAIL: NORMAL
NEUTROPHILS # BLD: 8.73 E9/L (ref 1.8–7.3)
NEUTS SEG NFR BLD: 76.1 % (ref 43–80)
NITRITE UR QL STRIP: POSITIVE
PH UR STRIP: 5.5 [PH] (ref 5–9)
PLATELET # BLD AUTO: 329 E9/L (ref 130–450)
PMV BLD AUTO: 11.3 FL (ref 7–12)
POSITIVE QC PASS/FAIL: NORMAL
POTASSIUM SERPL-SCNC: 4 MMOL/L (ref 3.5–5)
PROT SERPL-MCNC: 7.8 G/DL (ref 6.4–8.3)
PROT UR STRIP-MCNC: 100 MG/DL
RBC # BLD AUTO: 5.29 E12/L (ref 3.5–5.5)
RBC #/AREA URNS HPF: ABNORMAL /HPF (ref 0–2)
SODIUM SERPL-SCNC: 141 MMOL/L (ref 132–146)
SP GR UR STRIP: >=1.03 (ref 1–1.03)
TROPONIN, HIGH SENSITIVITY: <6 NG/L (ref 0–9)
UROBILINOGEN UR STRIP-ACNC: 1 E.U./DL
WBC # BLD: 11.5 E9/L (ref 4.5–11.5)
WBC #/AREA URNS HPF: ABNORMAL /HPF (ref 0–5)

## 2023-04-01 PROCEDURE — 81001 URINALYSIS AUTO W/SCOPE: CPT

## 2023-04-01 PROCEDURE — 85025 COMPLETE CBC W/AUTO DIFF WBC: CPT

## 2023-04-01 PROCEDURE — 6360000002 HC RX W HCPCS

## 2023-04-01 PROCEDURE — 80053 COMPREHEN METABOLIC PANEL: CPT

## 2023-04-01 PROCEDURE — 96374 THER/PROPH/DIAG INJ IV PUSH: CPT

## 2023-04-01 PROCEDURE — 84484 ASSAY OF TROPONIN QUANT: CPT

## 2023-04-01 PROCEDURE — 83605 ASSAY OF LACTIC ACID: CPT

## 2023-04-01 PROCEDURE — 87088 URINE BACTERIA CULTURE: CPT

## 2023-04-01 PROCEDURE — 74177 CT ABD & PELVIS W/CONTRAST: CPT

## 2023-04-01 PROCEDURE — 82962 GLUCOSE BLOOD TEST: CPT

## 2023-04-01 PROCEDURE — 99285 EMERGENCY DEPT VISIT HI MDM: CPT

## 2023-04-01 PROCEDURE — 83690 ASSAY OF LIPASE: CPT

## 2023-04-01 PROCEDURE — 93005 ELECTROCARDIOGRAM TRACING: CPT

## 2023-04-01 PROCEDURE — 2580000003 HC RX 258

## 2023-04-01 PROCEDURE — 6360000004 HC RX CONTRAST MEDICATION: Performed by: RADIOLOGY

## 2023-04-01 RX ORDER — 0.9 % SODIUM CHLORIDE 0.9 %
1000 INTRAVENOUS SOLUTION INTRAVENOUS ONCE
Status: COMPLETED | OUTPATIENT
Start: 2023-04-01 | End: 2023-04-01

## 2023-04-01 RX ORDER — ONDANSETRON 4 MG/1
4 TABLET, ORALLY DISINTEGRATING ORAL 3 TIMES DAILY PRN
Qty: 21 TABLET | Refills: 0 | Status: SHIPPED | OUTPATIENT
Start: 2023-04-01

## 2023-04-01 RX ORDER — CEFDINIR 300 MG/1
300 CAPSULE ORAL 2 TIMES DAILY
Qty: 14 CAPSULE | Refills: 0 | Status: SHIPPED | OUTPATIENT
Start: 2023-04-01 | End: 2023-04-08

## 2023-04-01 RX ADMIN — SODIUM CHLORIDE 1000 ML: 9 INJECTION, SOLUTION INTRAVENOUS at 12:21

## 2023-04-01 RX ADMIN — SODIUM CHLORIDE 1000 ML: 9 INJECTION, SOLUTION INTRAVENOUS at 10:49

## 2023-04-01 RX ADMIN — IOPAMIDOL 18 ML: 755 INJECTION, SOLUTION INTRAVENOUS at 13:28

## 2023-04-01 RX ADMIN — IOPAMIDOL 75 ML: 755 INJECTION, SOLUTION INTRAVENOUS at 13:28

## 2023-04-01 RX ADMIN — CEFTRIAXONE SODIUM 2000 MG: 2 INJECTION, POWDER, FOR SOLUTION INTRAMUSCULAR; INTRAVENOUS at 12:21

## 2023-04-01 NOTE — ED PROVIDER NOTES
by me.    Rate: 79  Rhythm: sinus  Axis: normal  Interpretation: No ST elevations, QRS is narrow, QTc is 426. Comparison: was normal and stable as compared to patient's most recent EKG        Past medical history/chonic conditions affecting care   has a past medical history of Anxiety, Benign essential hypertension antepartum (02/05/2015), Cancer ([facility] [address] Depression, Morbid obesity due to excess calories ([facility] [address] and Obesity. Social History     Tobacco Use    Smoking status: Never    Smokeless tobacco: Never   Vaping Use    Vaping Use: Never used   Substance Use Topics    Alcohol use: No    Drug use: No         As interpreted by me, the below displayed labs are abnormal. All other labs obtained during this visit were within normal range or not returned as of this dictation.   Labs Reviewed   URINALYSIS - Abnormal; Notable for the following components:       Result Value    Glucose, Ur 100 (*)     Bilirubin Urine LARGE (*)     Ketones, Urine >=80 (*)     Blood, Urine TRACE (*)     Protein,  (*)     Nitrite, Urine POSITIVE (*)     Leukocyte Esterase, Urine TRACE (*)     All other components within normal limits   CBC WITH AUTO DIFFERENTIAL - Abnormal; Notable for the following components:    Hemoglobin 16.6 (*)     Hematocrit 52.2 (*)     MCHC 31.8 (*)     Lymphocytes % 15.3 (*)     Neutrophils Absolute 8.73 (*)     All other components within normal limits   COMPREHENSIVE METABOLIC PANEL - Abnormal; Notable for the following components:    CO2 21 (*)     All other components within normal limits   MICROSCOPIC URINALYSIS - Abnormal; Notable for the following components:    Bacteria, UA MANY (*)     Crystals, UA Few (*)     All other components within normal limits   POCT GLUCOSE - Normal   CULTURE, URINE   LACTIC ACID   LIPASE   TROPONIN   POC PREGNANCY UR-QUAL   POCT GLUCOSE       Interpretation per the Radiologist below, if available at the time of this note:  CT ABDOMEN PELVIS W IV CONTRAST Additional MG capsule Take 1 capsule by mouth 2 times daily for 7 days, Disp-14 capsule, R-0Normal             DISCONTINUED MEDICATIONS:  Discharge Medication List as of 4/1/2023  3:36 PM                 (Please note that portions of this note were completed with a voice recognition program.  Efforts were made to edit the dictations but occasionally words are mis-transcribed.)         Bobbe Runner, MD  Resident  04/01/23 [id number]

## 2023-04-02 LAB
BACTERIA UR CULT: NORMAL
EKG ATRIAL RATE: 79 BPM
EKG P AXIS: 21 DEGREES
EKG P-R INTERVAL: 134 MS
EKG Q-T INTERVAL: 372 MS
EKG QRS DURATION: 70 MS
EKG QTC CALCULATION (BAZETT): 426 MS
EKG R AXIS: 36 DEGREES
EKG T AXIS: 37 DEGREES
EKG VENTRICULAR RATE: 79 BPM

## 2023-04-02 PROCEDURE — 93010 ELECTROCARDIOGRAM REPORT: CPT | Performed by: INTERNAL MEDICINE

## 2023-04-03 ENCOUNTER — TELEPHONE (OUTPATIENT)
Dept: BARIATRICS/WEIGHT MGMT | Age: 43
End: 2023-04-03

## 2023-04-03 LAB — BACTERIA UR CULT: NORMAL

## 2023-04-03 NOTE — TELEPHONE ENCOUNTER
Pt is nearly 6 weeks post op RYGB. Pt was dehydrated and not tolerating po intake, was hospitalized just this past weekend and received IV fluids and diagnosed with UTI, said she got a stomach flu as well. Pt now taking antibiotic. Pt struggling with protein intake, not tolerating whey protein shakes. Diet is pureed but pt is mainly taking just full liquids now. Pt toelerating vit/min supplement. Pt is eating yogurt. Pt able to drink some fairlife ready to drink supplement. Suggested a digestive probiotic - Ally or Culturelle would be fine. Suggested Higher protein yogurt 20-25 grams per 5.3 oz. Suggested pt can stop vit/min supplements if they are causing any nausea or issues. Suggested switching to Orgain or [address] based protein powders and pt does have these and plans to try as able. Pt is to focus on hydration and getting protein from whatever source is tolerated until feeling better. Pt will be in office on Wed, 4/5/23. All questions answered.

## 2023-04-05 ENCOUNTER — INITIAL CONSULT (OUTPATIENT)
Dept: BARIATRICS/WEIGHT MGMT | Age: 43
End: 2023-04-05

## 2023-04-05 ENCOUNTER — OFFICE VISIT (OUTPATIENT)
Dept: BARIATRICS/WEIGHT MGMT | Age: 43
End: 2023-04-05
Payer: COMMERCIAL

## 2023-04-05 VITALS — BODY MASS INDEX: 51.91 KG/M2 | WEIGHT: 293 LBS | HEIGHT: 63 IN

## 2023-04-05 VITALS
HEART RATE: 72 BPM | DIASTOLIC BLOOD PRESSURE: 80 MMHG | BODY MASS INDEX: 51.91 KG/M2 | WEIGHT: 293 LBS | RESPIRATION RATE: 20 BRPM | HEIGHT: 63 IN | TEMPERATURE: 97.6 F | SYSTOLIC BLOOD PRESSURE: 136 MMHG

## 2023-04-05 DIAGNOSIS — Z71.3 DIETARY COUNSELING: Primary | ICD-10-CM

## 2023-04-05 DIAGNOSIS — K91.2 MALNUTRITION FOLLOWING GASTROINTESTINAL SURGERY: Primary | ICD-10-CM

## 2023-04-05 DIAGNOSIS — K30 INDIGESTION: ICD-10-CM

## 2023-04-05 DIAGNOSIS — E66.01 MORBID OBESITY DUE TO EXCESS CALORIES (HCC): ICD-10-CM

## 2023-04-05 PROCEDURE — 99024 POSTOP FOLLOW-UP VISIT: CPT | Performed by: NURSE PRACTITIONER

## 2023-04-05 PROCEDURE — 99999 PR OFFICE/OUTPT VISIT,PROCEDURE ONLY: CPT

## 2023-04-05 PROCEDURE — 99212 OFFICE O/P EST SF 10 MIN: CPT

## 2023-04-05 RX ORDER — SUCRALFATE 1 G/1
0.5 TABLET ORAL 4 TIMES DAILY
Qty: 60 TABLET | Refills: 1 | Status: SHIPPED | OUTPATIENT
Start: 2023-04-05

## 2023-04-05 NOTE — PROGRESS NOTES
Patient is 6 wk LRYGB. Water intake is around 54 oz daily. Protein intake is lacking somewhat - taking in shakes. Vitamin intake is good. Bowel movements - diarrhea.  Was recently in the hospital.
Take 1 tablet by mouth daily      Multiple Vitamin (MULTI-VITAMIN DAILY PO) Take by mouth      ondansetron (ZOFRAN-ODT) 4 MG disintegrating tablet Take 1 tablet by mouth 3 times daily as needed for Nausea or Vomiting 21 tablet 0    cefdinir (OMNICEF) 300 MG capsule Take 1 capsule by mouth 2 times daily for 7 days 14 capsule 0    omeprazole (PRILOSEC) 40 MG delayed release capsule Take 1 capsule by mouth daily      acetaminophen (TYLENOL) 500 MG tablet Take 2 tablets by mouth as needed for Pain      DULoxetine (CYMBALTA) 60 MG extended release capsule Take 1 capsule by mouth nightly      LORazepam (ATIVAN) 0.5 MG tablet Take 1 tablet by mouth every 6 hours as needed for Anxiety. sucralfate (CARAFATE) 1 GM tablet Take 0.5 tablets by mouth 4 times daily 60 tablet 1     No current facility-administered medications for this visit. Allergies   Allergen Reactions    Food Nausea Only and Other (See Comments)     Lactose Intolerance - All Dairy    Tetracyclines & Related      Teeth turn yellow       History reviewed. No pertinent family history.     Social History     Socioeconomic History    Marital status: Single     Spouse name: Not on file    Number of children: Not on file    Years of education: Not on file    Highest education level: Not on file   Occupational History    Not on file   Tobacco Use    Smoking status: Never    Smokeless tobacco: Never   Vaping Use    Vaping Use: Never used   Substance and Sexual Activity    Alcohol use: No    Drug use: No    Sexual activity: Not on file   Other Topics Concern    Not on file   Social History Narrative    Not on file     Social Determinants of Health     Financial Resource Strain: Not on file   Food Insecurity: Not on file   Transportation Needs: Not on file   Physical Activity: Not on file   Stress: Not on file   Social Connections: Not on file   Intimate Partner Violence: Not on file   Housing Stability: Not on file       A complete 10 system review was

## 2023-04-05 NOTE — PATIENT INSTRUCTIONS
Please continue to take your vitamin and mineral supplements as instructed. If you received a blood work prescription today for laboratory monitoring due prior to your next routine follow-up visit, please have this blood work obtained 10 to 14 days prior to your next visit. It is important to fast for 12 hours prior to routine weight loss surgery blood work, EXCEPT for drinking water, to ensure accuracy of results. Please report nausea, vomiting, abdominal pain, or any other problems you experience to your surgeon. For problems related to weight loss surgery, it is best to go to [address] [place] [address] Emergency Department and have your surgeon paged.

## 2023-04-20 ENCOUNTER — HOSPITAL ENCOUNTER (OUTPATIENT)
Dept: INFUSION THERAPY | Age: 43
Setting detail: INFUSION SERIES
End: 2023-04-20

## 2023-05-22 ENCOUNTER — HOSPITAL ENCOUNTER (OUTPATIENT)
Age: 43
Discharge: HOME | End: 2023-05-22
Payer: COMMERCIAL

## 2023-05-22 DIAGNOSIS — K91.2 MALNUTRITION FOLLOWING GASTROINTESTINAL SURGERY: ICD-10-CM

## 2023-05-22 LAB
ALBUMIN SERPL-MCNC: 4.3 G/DL (ref 3.5–5.2)
ALP SERPL-CCNC: 91 U/L (ref 35–104)
ALT SERPL-CCNC: 17 U/L (ref 0–32)
ANION GAP SERPL CALCULATED.3IONS-SCNC: 12 MMOL/L (ref 7–16)
AST SERPL-CCNC: 15 U/L (ref 0–31)
BILIRUB SERPL-MCNC: 0.8 MG/DL (ref 0–1.2)
BUN SERPL-MCNC: 9 MG/DL (ref 6–20)
CALCIUM SERPL-MCNC: 9.5 MG/DL (ref 8.6–10.2)
CHLORIDE SERPL-SCNC: 109 MMOL/L (ref 98–107)
CO2 SERPL-SCNC: 23 MMOL/L (ref 22–29)
CREAT SERPL-MCNC: 0.7 MG/DL (ref 0.5–1)
ERYTHROCYTE [DISTWIDTH] IN BLOOD BY AUTOMATED COUNT: 14.6 FL (ref 11.5–15)
FERRITIN SERPL-MCNC: 62 NG/ML
FOLATE SERPL-MCNC: 11.2 NG/ML (ref 4.8–24.2)
GLUCOSE SERPL-MCNC: 84 MG/DL (ref 74–99)
HCT VFR BLD AUTO: 47.4 % (ref 34–48)
HGB BLD-MCNC: 14.9 G/DL (ref 11.5–15.5)
MCH RBC QN AUTO: 31.2 PG (ref 26–35)
MCHC RBC AUTO-ENTMCNC: 31.4 % (ref 32–34.5)
MCV RBC AUTO: 99.4 FL (ref 80–99.9)
PLATELET # BLD AUTO: 268 E9/L (ref 130–450)
PMV BLD AUTO: 11.4 FL (ref 7–12)
POTASSIUM SERPL-SCNC: 4 MMOL/L (ref 3.5–5)
PREALB SERPL-MCNC: 20 MG/DL (ref 20–40)
PROT SERPL-MCNC: 7.5 G/DL (ref 6.4–8.3)
RBC # BLD AUTO: 4.77 E12/L (ref 3.5–5.5)
SODIUM SERPL-SCNC: 144 MMOL/L (ref 132–146)
VIT B12 SERPL-MCNC: 309 PG/ML (ref 211–946)
WBC # BLD: 7.6 E9/L (ref 4.5–11.5)

## 2023-05-22 PROCEDURE — 82746 ASSAY OF FOLIC ACID SERUM: CPT

## 2023-05-22 PROCEDURE — 84425 ASSAY OF VITAMIN B-1: CPT

## 2023-05-22 PROCEDURE — 85027 COMPLETE CBC AUTOMATED: CPT

## 2023-05-22 PROCEDURE — 84134 ASSAY OF PREALBUMIN: CPT

## 2023-05-22 PROCEDURE — 82607 VITAMIN B-12: CPT

## 2023-05-22 PROCEDURE — 36415 COLL VENOUS BLD VENIPUNCTURE: CPT

## 2023-05-22 PROCEDURE — 80053 COMPREHEN METABOLIC PANEL: CPT

## 2023-05-22 PROCEDURE — 84630 ASSAY OF ZINC: CPT

## 2023-05-22 PROCEDURE — 82728 ASSAY OF FERRITIN: CPT

## 2023-05-25 LAB — ZINC SERPL-MCNC: 100.9 UG/DL (ref 60–120)

## 2023-05-26 ENCOUNTER — INITIAL CONSULT (OUTPATIENT)
Dept: BARIATRICS/WEIGHT MGMT | Age: 43
End: 2023-05-26

## 2023-05-26 ENCOUNTER — OFFICE VISIT (OUTPATIENT)
Dept: BARIATRICS/WEIGHT MGMT | Age: 43
End: 2023-05-26
Payer: COMMERCIAL

## 2023-05-26 VITALS
WEIGHT: 293 LBS | BODY MASS INDEX: 51.91 KG/M2 | TEMPERATURE: 97.2 F | HEIGHT: 63 IN | DIASTOLIC BLOOD PRESSURE: 95 MMHG | SYSTOLIC BLOOD PRESSURE: 164 MMHG | RESPIRATION RATE: 20 BRPM | HEART RATE: 67 BPM

## 2023-05-26 VITALS — BODY MASS INDEX: 51.91 KG/M2 | WEIGHT: 293 LBS | HEIGHT: 63 IN

## 2023-05-26 DIAGNOSIS — Z71.3 DIETARY COUNSELING: Primary | ICD-10-CM

## 2023-05-26 DIAGNOSIS — E66.01 MORBID OBESITY DUE TO EXCESS CALORIES (HCC): ICD-10-CM

## 2023-05-26 DIAGNOSIS — K91.2 MALNUTRITION FOLLOWING GASTROINTESTINAL SURGERY: Primary | ICD-10-CM

## 2023-05-26 LAB — VIT B1 BLD-MCNC: 205 NMOL/L (ref 70–180)

## 2023-05-26 PROCEDURE — 99213 OFFICE O/P EST LOW 20 MIN: CPT | Performed by: NURSE PRACTITIONER

## 2023-05-26 PROCEDURE — G8417 CALC BMI ABV UP PARAM F/U: HCPCS | Performed by: NURSE PRACTITIONER

## 2023-05-26 PROCEDURE — 1036F TOBACCO NON-USER: CPT | Performed by: NURSE PRACTITIONER

## 2023-05-26 PROCEDURE — 99999 PR OFFICE/OUTPT VISIT,PROCEDURE ONLY: CPT

## 2023-05-26 PROCEDURE — G8427 DOCREV CUR MEDS BY ELIG CLIN: HCPCS | Performed by: NURSE PRACTITIONER

## 2023-05-26 NOTE — PATIENT INSTRUCTIONS
Please continue to take your vitamin and mineral supplements as instructed. If you received a blood work prescription today for laboratory monitoring due prior to your next routine follow-up visit, please have this blood work obtained 10 to 14 days prior to your next visit. It is important to fast for 12 hours prior to routine weight loss surgery blood work, EXCEPT for drinking water, to ensure accuracy of results. Please report nausea, vomiting, abdominal pain, or any other problems you experience to your surgeon. For problems related to weight loss surgery, it is best to go to [address] [place] [address] Emergency Department and have your surgeon paged.

## 2023-05-26 NOTE — PROGRESS NOTES
Tracy Moreno  5/26/2023  Laparoscopic Sd-en- Y Gastric Bypass   3 months Post-Operative Follow-up. Chief Complaint   Patient presents with    Bariatrics Post Op Follow Up       Subjective:   Tracy Moreno is a 39 y.o. female three months post Laparoscopic Sd-en-Y Gastric Bypass. She reports that she is doing good. Reports no problems with eating, bowel movements, voiding, or the wounds. She is not having swallowing difficulty, is compliant most of the time with the iron supplement and multivitamins and calcium + Vit D. She is meeting fluid recommendations of at least 64 ounces per day and is meeting protein recommendations. Exercise:  has been walking a lot more, taking stairs instead of the elevator at work . Weight=(!) 311 lb (141.1 kg)  Today's weight represents a total weight loss to date of 52 pounds. Prior to Admission medications    Medication Sig Start Date End Date Taking?  Authorizing Provider   Cholecalciferol (VITAMIN D3) 125 MCG (5000 UT) TABS Take 1 tablet by mouth daily   Yes Historical Provider, MD   Multiple Vitamins-Minerals (HAIR SKIN AND NAILS FORMULA PO) Take 1 tablet by mouth daily   Yes Historical Provider, MD   Ferrous Sulfate (IRON PO) Take by mouth   Yes Historical Provider, MD   Calcium Citrate-Vitamin D (CALCIUM + VIT D, BARIATRIC ADVANTAGE, CHEWABLE TABLET) Take 1 tablet by mouth daily   Yes Historical Provider, MD   Multiple Vitamin (MULTI-VITAMIN DAILY PO) Take by mouth   Yes Historical Provider, MD   ondansetron (ZOFRAN-ODT) 4 MG disintegrating tablet Take 1 tablet by mouth 3 times daily as needed for Nausea or Vomiting 4/1/23  Yes Braeden Rubi MD   omeprazole (PRILOSEC) 40 MG delayed release capsule Take 1 capsule by mouth daily   Yes Historical Provider, MD   acetaminophen (TYLENOL) 500 MG tablet Take 2 tablets by mouth as needed for Pain   Yes Historical Provider, MD   DULoxetine (CYMBALTA) 60 MG extended release capsule Take 1 capsule by mouth nightly   Yes

## 2023-05-26 NOTE — PROGRESS NOTES
Medical Nutrition Therapy (MNT) Assessment     Pt. Name: Cliff Hoang   Date:5/26/2023  F/U Appt: Sx Type 3 mo rygb      Ht 5' 3\" (1.6 m)   Wt (!) 311 lb (141.1 kg)   BMI 55.09 kg/m²  Height: 5' 3\" (1.6 m) Weight: (!) 311 lb (141.1 kg)   IBW:ideal body weight   147 lbs 24% % EBWL: 24%     Wt Readings from Last 3 Encounters:   05/26/23 (!) 311 lb (141.1 kg)   05/26/23 (!) 311 lb (141.1 kg)   04/05/23 (!) 333 lb (151 kg)                     Protein supplements:   Daily Protein needs (based on 1.0 gram per kg of IBW)  67-77 grams       Subjective:                    MNT ADVANCE TO:     Bariatric soft diet introducing raw fruit, raw vegetables, rice, protein bars, multivitamin, calcium, protein supplements          [address] Bowel Protocol:  no - Diarrhea  No - Constipation  How much plain water are you drinking daily 64 oz plus            How many meals a day 6 / Portions Sizes of Meals are 4 oz or less          5/22/23 Labs: chloride 109H    Supplements: Bariatric Advantage Multi-Vitamin 1 tablet 2 times Daily, Bariatric Advantage 29 mgs Iron 1 tablet Daily, Bariatric Advanatge Calcium Lozenges 1 tablet 3 times Daily , Dry Vitamin D3 5,000iu every day. Pt is aware in order to avoid medical complications from nutritional deficiencies created by WLS pts will have to take the bariatric approved supplements lifelong. List and education provided. Pt verbalized understanding. Estimated Daily Nutritional Needs: Based on Bariatric procedure for Wt. Loss  Energy: Will be calculated at Maintenance Stage    Protein: 60 - 80 gms Daily    MNT Plan and Additional Education: RD/LD reviewed Bariatric Soft Diet incorporating in Raw Fruits, Raw Vegetables, Red Meat, and Rice. Encouraged pt to meet protein and fluid needs daily. Handouts given. Pt. verbalized understanding. Pt instructed to call with questions.       MEDICAL NUTRITION THERAPY (MNT) - Nutrition Care Plan   (The patient has been educated and given written education

## 2023-08-15 ENCOUNTER — TELEPHONE (OUTPATIENT)
Dept: BARIATRICS/WEIGHT MGMT | Age: 43
End: 2023-08-15

## 2023-08-15 NOTE — TELEPHONE ENCOUNTER
Patient cancelled her 6 month PO with Linnea Driver due to no longer having insurance. She got a new job and her income is now too high to remain on [address] [facility]. She is looking for an affordable insurance, will let us know when she has insurance. In the meantime I faxed her an application for financial assistance.

## 2024-01-10 ENCOUNTER — TELEPHONE (OUTPATIENT)
Dept: BARIATRICS/WEIGHT MGMT | Age: 44
End: 2024-01-10

## 2024-01-10 NOTE — TELEPHONE ENCOUNTER
Patient is due for their annual follow up and needs scheduled. I called the patient and there was no answer.

## 2024-12-18 ENCOUNTER — TELEPHONE (OUTPATIENT)
Dept: BARIATRICS/WEIGHT MGMT | Age: 44
End: 2024-12-18

## 2024-12-18 NOTE — TELEPHONE ENCOUNTER
Patient is due for their annual follow up and needs scheduled. I called the patient and she stated that she is following with her PCP and that everything is fine. She did not wish to schedule.

## (undated) DEVICE — TIP-UP FENESTRATED GRASPER: Brand: ENDOWRIST

## (undated) DEVICE — SYNCHROSEAL: Brand: DA VINCI ENERGY

## (undated) DEVICE — BLOCK BITE 60FR CAREGUARD

## (undated) DEVICE — VALVE SUCTION AIR H2O HYDR H2O JET CONN STRL ORCA POD + DISP

## (undated) DEVICE — SUTURE V-LOC 180 SZ 0 L9IN ABSRB GRN GS-21 L37MM 1/2 CIR VLOCL0346

## (undated) DEVICE — GARMENT,MEDLINE,DVT,INT,CALF,MED, GEN2: Brand: MEDLINE

## (undated) DEVICE — GOWN ISOLATN REG YEL M WT MULTIPLY SIDETIE LEV 2

## (undated) DEVICE — [HIGH FLOW INSUFFLATOR,  DO NOT USE IF PACKAGE IS DAMAGED,  KEEP DRY,  KEEP AWAY FROM SUNLIGHT,  PROTECT FROM HEAT AND RADIOACTIVE SOURCES.]: Brand: PNEUMOSURE

## (undated) DEVICE — TUBING, SUCTION, 1/4" X 10', STRAIGHT: Brand: MEDLINE

## (undated) DEVICE — BLADELESS OBTURATOR: Brand: WECK VISTA

## (undated) DEVICE — 6 X 9  1.75MIL 4-WALL LABGUARD: Brand: MINIGRIP COMMERCIAL LLC

## (undated) DEVICE — MDS601L2 INT-CS,CALF,GEN2 LG,BL/RD: Brand: MEDLINE

## (undated) DEVICE — APPLICATOR MEDICATED 26 CC SOLUTION HI LT ORNG CHLORAPREP

## (undated) DEVICE — BLADELESS OBTURATOR, LONG: Brand: WECK VISTA

## (undated) DEVICE — CADIERE FORCEPS: Brand: ENDOWRIST

## (undated) DEVICE — ELECTRODE PT RET AD L9FT HI MOIST COND ADH HYDRGEL CORDED

## (undated) DEVICE — COLUMN DRAPE

## (undated) DEVICE — KENDALL 450 SERIES MONITORING FOAM ELECTRODE - RECTANGULAR SHAPE ( 3/PK): Brand: KENDALL

## (undated) DEVICE — YANKAUER,BULB TIP,W/O VENT,RIGID,STERILE: Brand: MEDLINE

## (undated) DEVICE — CONTAINER SPEC COLL 960ML POLYPR TRIANG GRAD INTAKE/OUTPUT

## (undated) DEVICE — PACK SURG LAP CHOLE CUSTOM

## (undated) DEVICE — STAPLER 60 RELOAD BLUE: Brand: SUREFORM

## (undated) DEVICE — PUMP SUC IRR TBNG L10FT W/ HNDPC ASSEMB STRYKEFLOW 2

## (undated) DEVICE — MARKER,SKIN,WI/RULER AND LABELS: Brand: MEDLINE

## (undated) DEVICE — KIT BEDSIDE REVITAL OX 500ML

## (undated) DEVICE — CLEANER LENS C-CLEAR

## (undated) DEVICE — REDUCER: Brand: ENDOWRIST

## (undated) DEVICE — MASK O2 AD TB L7FT HI CONC PART N RBRTH W RESVR BG SFTY

## (undated) DEVICE — ADAPTER CLEANING PORPOISE CLEANING

## (undated) DEVICE — CANNULA SEAL

## (undated) DEVICE — MEGA SUTURECUT ND: Brand: ENDOWRIST

## (undated) DEVICE — ELECTRO LUBE IS A SINGLE PATIENT USE DEVICE THAT IS INTENDED TO BE USED ON ELECTROSURGICAL ELECTRODES TO REDUCE STICKING.: Brand: KEY SURGICAL ELECTRO LUBE

## (undated) DEVICE — SEAL

## (undated) DEVICE — SHEET DRAPE FULL 70X100

## (undated) DEVICE — CONTAINER SPEC 480ML CLR POLYSTYR 10% NEUT BUFF FRMLN ZN

## (undated) DEVICE — LUBRICANT SURG JELLY ST BACTER TUBE 4.25OZ

## (undated) DEVICE — STAPLER 60 RELOAD WHITE: Brand: SUREFORM

## (undated) DEVICE — MASK,FACE,MAXFLUIDPROTECT,SHIELD/ERLPS: Brand: MEDLINE

## (undated) DEVICE — GLOVE ORANGE PI 7 1/2   MSG9075

## (undated) DEVICE — MICRO TIP WIPE: Brand: DEVON

## (undated) DEVICE — SOLUTION IRRIG 1000ML STRL H2O USP PLAS POUR BTL

## (undated) DEVICE — WARMER SCP LAP

## (undated) DEVICE — TOWEL,OR,DSP,ST,BLUE,STD,6/PK,12PK/CS: Brand: MEDLINE

## (undated) DEVICE — PITCHER PT 1200ML W HNDL CSR WRP

## (undated) DEVICE — DOUBLE BASIN SET: Brand: MEDLINE INDUSTRIES, INC.

## (undated) DEVICE — SPONGE GZ 4IN 4IN 4 PLY N WVN AVANT

## (undated) DEVICE — SUTURE ABSRB L6IN L37MM 0 GS-21 GRN 1/2 CIR TAPR PNT NDL VLOCL0306

## (undated) DEVICE — GOWN,SIRUS,NONRNF,SETINSLV,XL,20/CS: Brand: MEDLINE

## (undated) DEVICE — INSUFFLATION NEEDLE TO ESTABLISH PNEUMOPERITONEUM.: Brand: INSUFFLATION NEEDLE

## (undated) DEVICE — STAPLER 60: Brand: SUREFORM

## (undated) DEVICE — SOLUTION IRRIG 1000ML 0.9% SOD CHL USP POUR PLAS BTL

## (undated) DEVICE — CLOTH SURG PREP PREOPERATIVE CHLORHEXIDINE GLUC 2% READYPREP

## (undated) DEVICE — FORCEPS BX L240CM JAW DIA2.8MM L CAP W/ NDL MIC MESH TOOTH

## (undated) DEVICE — COVER,LIGHT HANDLE,FLX,1/PK: Brand: MEDLINE INDUSTRIES, INC.

## (undated) DEVICE — ARM DRAPE